# Patient Record
Sex: FEMALE | Race: WHITE | Employment: OTHER | URBAN - METROPOLITAN AREA
[De-identification: names, ages, dates, MRNs, and addresses within clinical notes are randomized per-mention and may not be internally consistent; named-entity substitution may affect disease eponyms.]

---

## 2018-03-12 ENCOUNTER — HOSPITAL ENCOUNTER (OUTPATIENT)
Age: 83
Discharge: HOME | End: 2018-03-14
Payer: MEDICARE

## 2018-03-12 PROCEDURE — 87088 URINE BACTERIA CULTURE: CPT

## 2018-03-12 PROCEDURE — 87186 SC STD MICRODIL/AGAR DIL: CPT

## 2018-03-12 PROCEDURE — 87077 CULTURE AEROBIC IDENTIFY: CPT

## 2018-03-14 LAB
ORGANISM: ABNORMAL
URINE CULTURE, ROUTINE: ABNORMAL
URINE CULTURE, ROUTINE: ABNORMAL

## 2019-01-12 ENCOUNTER — HOSPITAL ENCOUNTER (OUTPATIENT)
Age: 84
Discharge: HOME | End: 2019-01-14
Payer: MEDICARE

## 2019-01-12 LAB
ALBUMIN SERPL-MCNC: 3.8 G/DL (ref 3.5–5.2)
ALP BLD-CCNC: 58 U/L (ref 35–104)
ALT SERPL-CCNC: 9 U/L (ref 0–32)
ANION GAP SERPL CALCULATED.3IONS-SCNC: 16 MMOL/L (ref 7–16)
AST SERPL-CCNC: 15 U/L (ref 0–31)
BASOPHILS ABSOLUTE: 0.01 E9/L (ref 0–0.2)
BASOPHILS RELATIVE PERCENT: 0.1 % (ref 0–2)
BILIRUB SERPL-MCNC: 0.3 MG/DL (ref 0–1.2)
BUN BLDV-MCNC: 12 MG/DL (ref 8–23)
C-REACTIVE PROTEIN: <0.1 MG/DL (ref 0–0.4)
CALCIUM SERPL-MCNC: 9.1 MG/DL (ref 8.6–10.2)
CHLORIDE BLD-SCNC: 103 MMOL/L (ref 98–107)
CHOLESTEROL, TOTAL: 168 MG/DL (ref 0–199)
CO2: 24 MMOL/L (ref 22–29)
CREAT SERPL-MCNC: 0.6 MG/DL (ref 0.5–1)
EOSINOPHILS ABSOLUTE: 0.08 E9/L (ref 0.05–0.5)
EOSINOPHILS RELATIVE PERCENT: 1 % (ref 0–6)
FOLATE: >20 NG/ML (ref 4.8–24.2)
GFR AFRICAN AMERICAN: >60
GFR NON-AFRICAN AMERICAN: >60 ML/MIN/1.73
GLUCOSE BLD-MCNC: 131 MG/DL (ref 74–99)
HBA1C MFR BLD: 7.5 % (ref 4–5.6)
HCT VFR BLD CALC: 37.6 % (ref 34–48)
HDLC SERPL-MCNC: 56 MG/DL
HEMOGLOBIN: 12.4 G/DL (ref 11.5–15.5)
IMMATURE GRANULOCYTES #: 0.02 E9/L
IMMATURE GRANULOCYTES %: 0.2 % (ref 0–5)
LDL CHOLESTEROL CALCULATED: 87 MG/DL (ref 0–99)
LYMPHOCYTES ABSOLUTE: 2.46 E9/L (ref 1.5–4)
LYMPHOCYTES RELATIVE PERCENT: 30.2 % (ref 20–42)
MCH RBC QN AUTO: 31.5 PG (ref 26–35)
MCHC RBC AUTO-ENTMCNC: 33 % (ref 32–34.5)
MCV RBC AUTO: 95.4 FL (ref 80–99.9)
MONOCYTES ABSOLUTE: 0.64 E9/L (ref 0.1–0.95)
MONOCYTES RELATIVE PERCENT: 7.9 % (ref 2–12)
NEUTROPHILS ABSOLUTE: 4.94 E9/L (ref 1.8–7.3)
NEUTROPHILS RELATIVE PERCENT: 60.6 % (ref 43–80)
PDW BLD-RTO: 12.4 FL (ref 11.5–15)
PLATELET # BLD: 244 E9/L (ref 130–450)
PMV BLD AUTO: 10.4 FL (ref 7–12)
POTASSIUM SERPL-SCNC: 4 MMOL/L (ref 3.5–5)
RBC # BLD: 3.94 E12/L (ref 3.5–5.5)
SEDIMENTATION RATE, ERYTHROCYTE: 2 MM/HR (ref 0–20)
SODIUM BLD-SCNC: 143 MMOL/L (ref 132–146)
T4 FREE: 1.04 NG/DL (ref 0.93–1.7)
TOTAL PROTEIN: 6.1 G/DL (ref 6.4–8.3)
TRIGL SERPL-MCNC: 125 MG/DL (ref 0–149)
TSH SERPL DL<=0.05 MIU/L-ACNC: 1.65 UIU/ML (ref 0.27–4.2)
VITAMIN B-12: 513 PG/ML (ref 211–946)
VLDLC SERPL CALC-MCNC: 25 MG/DL
WBC # BLD: 8.2 E9/L (ref 4.5–11.5)

## 2019-01-12 PROCEDURE — 84439 ASSAY OF FREE THYROXINE: CPT

## 2019-01-12 PROCEDURE — 83036 HEMOGLOBIN GLYCOSYLATED A1C: CPT

## 2019-01-12 PROCEDURE — 82607 VITAMIN B-12: CPT

## 2019-01-12 PROCEDURE — 82746 ASSAY OF FOLIC ACID SERUM: CPT

## 2019-01-12 PROCEDURE — 80061 LIPID PANEL: CPT

## 2019-01-12 PROCEDURE — 85651 RBC SED RATE NONAUTOMATED: CPT

## 2019-01-12 PROCEDURE — 80053 COMPREHEN METABOLIC PANEL: CPT

## 2019-01-12 PROCEDURE — 84443 ASSAY THYROID STIM HORMONE: CPT

## 2019-01-12 PROCEDURE — 85025 COMPLETE CBC W/AUTO DIFF WBC: CPT

## 2019-01-12 PROCEDURE — 86140 C-REACTIVE PROTEIN: CPT

## 2019-01-18 ENCOUNTER — HOSPITAL ENCOUNTER (OUTPATIENT)
Age: 84
Discharge: HOME | End: 2019-01-20
Payer: MEDICARE

## 2019-01-18 PROCEDURE — 80177 DRUG SCRN QUAN LEVETIRACETAM: CPT

## 2019-01-20 LAB — KEPPRA: 2 UG/ML (ref 12–46)

## 2019-02-22 ENCOUNTER — HOSPITAL ENCOUNTER (OUTPATIENT)
Age: 84
Discharge: HOME | End: 2019-02-24
Payer: MEDICARE

## 2019-02-22 PROCEDURE — 80177 DRUG SCRN QUAN LEVETIRACETAM: CPT

## 2019-02-24 LAB — KEPPRA: 2 UG/ML (ref 12–46)

## 2019-08-01 ENCOUNTER — HOSPITAL ENCOUNTER (OUTPATIENT)
Age: 84
Discharge: HOME | End: 2019-08-03
Payer: MEDICARE

## 2019-08-01 LAB
BILIRUBIN URINE: NEGATIVE
BLOOD, URINE: NEGATIVE
CLARITY: ABNORMAL
COLOR: YELLOW
GLUCOSE URINE: NEGATIVE MG/DL
KETONES, URINE: NEGATIVE MG/DL
LEUKOCYTE ESTERASE, URINE: ABNORMAL
NITRITE, URINE: NEGATIVE
PH UA: 5.5 (ref 5–9)
PROTEIN UA: NEGATIVE MG/DL
SPECIFIC GRAVITY UA: >=1.03 (ref 1–1.03)
UROBILINOGEN, URINE: 0.2 E.U./DL

## 2019-08-01 PROCEDURE — 87088 URINE BACTERIA CULTURE: CPT

## 2019-08-01 PROCEDURE — 81001 URINALYSIS AUTO W/SCOPE: CPT

## 2019-08-01 PROCEDURE — 87186 SC STD MICRODIL/AGAR DIL: CPT

## 2019-08-02 LAB
AMORPHOUS: ABNORMAL
BACTERIA: ABNORMAL /HPF
CRYSTALS, UA: ABNORMAL
EPITHELIAL CELLS, UA: ABNORMAL /HPF
RBC UA: ABNORMAL /HPF (ref 0–2)
WBC UA: ABNORMAL /HPF (ref 0–5)

## 2019-08-04 LAB
ORGANISM: ABNORMAL
ORGANISM: ABNORMAL
URINE CULTURE, ROUTINE: ABNORMAL

## 2019-08-14 ENCOUNTER — HOSPITAL ENCOUNTER (INPATIENT)
Dept: HOSPITAL 83 - ED | Age: 84
LOS: 5 days | Discharge: HOME HEALTH | DRG: 690 | End: 2019-08-19
Attending: INTERNAL MEDICINE | Admitting: INTERNAL MEDICINE
Payer: MEDICARE

## 2019-08-14 VITALS — SYSTOLIC BLOOD PRESSURE: 121 MMHG | DIASTOLIC BLOOD PRESSURE: 78 MMHG

## 2019-08-14 VITALS — BODY MASS INDEX: 25.11 KG/M2 | WEIGHT: 160 LBS | HEIGHT: 66.97 IN

## 2019-08-14 VITALS — DIASTOLIC BLOOD PRESSURE: 89 MMHG

## 2019-08-14 DIAGNOSIS — Z90.79: ICD-10-CM

## 2019-08-14 DIAGNOSIS — D72.810: ICD-10-CM

## 2019-08-14 DIAGNOSIS — I25.2: ICD-10-CM

## 2019-08-14 DIAGNOSIS — Z91.040: ICD-10-CM

## 2019-08-14 DIAGNOSIS — Z88.8: ICD-10-CM

## 2019-08-14 DIAGNOSIS — Z90.722: ICD-10-CM

## 2019-08-14 DIAGNOSIS — E11.65: ICD-10-CM

## 2019-08-14 DIAGNOSIS — I10: ICD-10-CM

## 2019-08-14 DIAGNOSIS — Z79.82: ICD-10-CM

## 2019-08-14 DIAGNOSIS — Z79.84: ICD-10-CM

## 2019-08-14 DIAGNOSIS — M54.5: ICD-10-CM

## 2019-08-14 DIAGNOSIS — I25.810: ICD-10-CM

## 2019-08-14 DIAGNOSIS — Z51.5: ICD-10-CM

## 2019-08-14 DIAGNOSIS — Z90.710: ICD-10-CM

## 2019-08-14 DIAGNOSIS — N39.0: Primary | ICD-10-CM

## 2019-08-14 DIAGNOSIS — Z95.1: ICD-10-CM

## 2019-08-14 DIAGNOSIS — Z66: ICD-10-CM

## 2019-08-14 DIAGNOSIS — K59.00: ICD-10-CM

## 2019-08-14 DIAGNOSIS — G93.49: ICD-10-CM

## 2019-08-14 DIAGNOSIS — G40.209: ICD-10-CM

## 2019-08-14 DIAGNOSIS — G89.29: ICD-10-CM

## 2019-08-14 DIAGNOSIS — I45.2: ICD-10-CM

## 2019-08-14 DIAGNOSIS — Z79.899: ICD-10-CM

## 2019-08-14 DIAGNOSIS — B96.20: ICD-10-CM

## 2019-08-14 DIAGNOSIS — M48.00: ICD-10-CM

## 2019-08-14 DIAGNOSIS — Z84.89: ICD-10-CM

## 2019-08-14 DIAGNOSIS — F01.51: ICD-10-CM

## 2019-08-14 DIAGNOSIS — I35.0: ICD-10-CM

## 2019-08-14 DIAGNOSIS — Z95.0: ICD-10-CM

## 2019-08-14 LAB
ALBUMIN SERPL-MCNC: 3.3 GM/DL (ref 3.1–4.5)
ALP SERPL-CCNC: 76 U/L (ref 45–117)
ALT SERPL W P-5'-P-CCNC: 14 U/L (ref 12–78)
APPEARANCE UR: (no result)
AST SERPL-CCNC: 7 IU/L (ref 3–35)
BACTERIA #/AREA URNS HPF: (no result) /[HPF]
BASOPHILS # BLD AUTO: 0 10*3/UL (ref 0–0.1)
BASOPHILS NFR BLD AUTO: 0.1 % (ref 0–1)
BILIRUB UR QL STRIP: NEGATIVE
BUN SERPL-MCNC: 13 MG/DL (ref 7–24)
CHLORIDE SERPL-SCNC: 103 MMOL/L (ref 98–107)
COLOR UR: YELLOW
CREAT SERPL-MCNC: 0.69 MG/DL (ref 0.55–1.02)
EOSINOPHIL # BLD AUTO: 0.1 10*3/UL (ref 0–0.4)
EOSINOPHIL # BLD AUTO: 1 % (ref 1–4)
EPI CELLS #/AREA URNS HPF: (no result) /[HPF]
ERYTHROCYTE [DISTWIDTH] IN BLOOD BY AUTOMATED COUNT: 12.9 % (ref 0–14.5)
GLUCOSE UR QL: (no result)
HCT VFR BLD AUTO: 39.9 % (ref 37–47)
HGB BLD-MCNC: 13.3 G/DL (ref 12–16)
HGB UR QL STRIP: (no result)
KETONES UR QL STRIP: NEGATIVE
LEUKOCYTE ESTERASE UR QL STRIP: (no result)
LYMPHOCYTES # BLD AUTO: 2.2 10*3/UL (ref 1.3–4.4)
LYMPHOCYTES NFR BLD AUTO: 25.7 % (ref 27–41)
MCH RBC QN AUTO: 31.4 PG (ref 27–31)
MCHC RBC AUTO-ENTMCNC: 33.3 G/DL (ref 33–37)
MCV RBC AUTO: 94.1 FL (ref 81–99)
MONOCYTES # BLD AUTO: 0.8 10*3/UL (ref 0.1–1)
MONOCYTES NFR BLD MANUAL: 8.6 % (ref 3–9)
NEUT #: 5.6 10*3/UL (ref 2.3–7.9)
NEUT %: 64.3 % (ref 47–73)
NITRITE UR QL STRIP: POSITIVE
NRBC BLD QL AUTO: 0 % (ref 0–0)
PH UR STRIP: 6 [PH] (ref 5–9)
PLATELET # BLD AUTO: 242 10*3/UL (ref 130–400)
PMV BLD AUTO: 8.9 FL (ref 9.6–12.3)
POTASSIUM SERPL-SCNC: 4.2 MMOL/L (ref 3.5–5.1)
PROT SERPL-MCNC: 6.9 GM/DL (ref 6.4–8.2)
RBC # BLD AUTO: 4.24 10*6/UL (ref 4.1–5.1)
RBC #/AREA URNS HPF: (no result) RBC/HPF (ref 0–2)
SODIUM SERPL-SCNC: 136 MMOL/L (ref 136–145)
SP GR UR: 1.01 (ref 1–1.03)
UROBILINOGEN UR STRIP-MCNC: 0.2 E.U./DL (ref 0.2–1)
WBC #/AREA URNS HPF: (no result) WBC/HPF (ref 0–5)
WBC NRBC COR # BLD AUTO: 8.7 10*3/UL (ref 4.8–10.8)

## 2019-08-14 NOTE — EKG
[place], [place]
 
                               ELECTROCARDIOGRAM REPORT
 
        NAME: DEYVI LEMA                 ACCT #: [id number]  
        UNIT #: [id number]                        ROOM: 410       
        DOCTOR: BARBIE DRAFT REPORT          BIRTHDATE: [birth year]
 
 
 

 
 
                           [facility]
                                       
Test Date:    2019-08-15               Test Time:    06:25:18
Pat Name:     DEYVI LEMA           Department:   
Patient ID:   [id number]             Room:         410 1
Gender:       F                        Technician:   
:          1933               Requested By: TAI MALDONADO
Order Number: [id number]      Reading MD:   Kwasi Bowers MD
                                 Measurements
Intervals                              Axis          
Rate:         76                       P:            19
CT:           181                      QRS:          -40
QRSD:         142                      T:            112
QT:           444                                    
QTc:          500                                    
                           Interpretive Statements
Sinus rhythm
Nonspecific intraventricular conduction delay, consider atypical RBBB
LAFB
Nonspecific ST \T\ T abnormalities
 
 
 
Electronically Signed On 8- 5:42:20 PDT by Kwasi Bowers MD
 
CM:EKGRPT:ELECTROCARDIOGRAM REPORT
 
D: 08/15/19 0625
T: 08/15/19 0542
    
TAI MALDONADO                                            
EPIPHANY DRAFT REPORT         
TAI MALDONADO

## 2019-08-14 NOTE — EKG
[place], [place]
 
                               ELECTROCARDIOGRAM REPORT
 
        NAME: DEYVI LEMA                 ACCT #: [id number]  
        UNIT #: [id number]                        ROOM: 410       
        DOCTOR: BARBIE DRAFT REPORT          BIRTHDATE: [birth year]
 
 
 

 
 
                           [facility]
                                       
Test Date:    2019               Test Time:    15:57:15
Pat Name:     DEYVI LEMA           Department:   
Patient ID:   [id number]             Room:         410
Gender:       F                        Technician:   
:          1933               Requested By: ANITHA YU
Order Number: [id number]      Reading MD:   Kwasi Bowers MD
                                 Measurements
Intervals                              Axis          
Rate:         71                       P:            27
NJ:           184                      QRS:          -35
QRSD:         136                      T:            56
QT:           459                                    
QTc:          499                                    
                           Interpretive Statements
Sinus rhythm
Nonspecific intraventricular conduction delay
LAFB
 
 
 
Electronically Signed On 8- 5:38:08 PDT by Kwasi Bowers MD
 
CM:EKGRPT:ELECTROCARDIOGRAM REPORT
 
D: 19 1557
T: 08/15/19 0538
    
ANITHA VALENTIN DRAFT REPORT         
ANITHA YU M.D.

## 2019-08-14 NOTE — NUR
A 85, admitted to , under the
services of PILO Blandon DO with a diagnosis of UTI.
Chief complaint is CHANGE IN MENTAL STATUS.
Patient arrived via stretcher from ER.
Monitor applied. Initial assessment completed.
Vital signs taken and recorded.
PILO BLANDON DO notified of admission to the unit.
Orders received.
See assessment for past medical history, medications
and allergies.
Patient and/or family oriented to unit. [facility]
visitation policy reviewed.
Clothing/patient valuable form completed.
 
HOMER PEREA
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
## 2019-08-15 VITALS — DIASTOLIC BLOOD PRESSURE: 59 MMHG | SYSTOLIC BLOOD PRESSURE: 149 MMHG

## 2019-08-15 VITALS — DIASTOLIC BLOOD PRESSURE: 41 MMHG

## 2019-08-15 VITALS — SYSTOLIC BLOOD PRESSURE: 148 MMHG | DIASTOLIC BLOOD PRESSURE: 74 MMHG

## 2019-08-15 VITALS — DIASTOLIC BLOOD PRESSURE: 92 MMHG

## 2019-08-15 VITALS — DIASTOLIC BLOOD PRESSURE: 70 MMHG

## 2019-08-15 LAB
APTT PPP: 25.1 SECONDS (ref 20–32.1)
BASOPHILS # BLD AUTO: 0 10*3/UL (ref 0–0.1)
BASOPHILS NFR BLD AUTO: 0 % (ref 0–1)
BUN SERPL-MCNC: 10 MG/DL (ref 7–24)
CHLORIDE SERPL-SCNC: 104 MMOL/L (ref 98–107)
CREAT SERPL-MCNC: 0.59 MG/DL (ref 0.55–1.02)
EOSINOPHIL # BLD AUTO: 0.1 10*3/UL (ref 0–0.4)
EOSINOPHIL # BLD AUTO: 0.9 % (ref 1–4)
ERYTHROCYTE [DISTWIDTH] IN BLOOD BY AUTOMATED COUNT: 12.7 % (ref 0–14.5)
HCT VFR BLD AUTO: 39.8 % (ref 37–47)
HGB BLD-MCNC: 13.1 G/DL (ref 12–16)
INR BLD: 1 (ref 2–3.5)
LYMPHOCYTES # BLD AUTO: 1.6 10*3/UL (ref 1.3–4.4)
LYMPHOCYTES NFR BLD AUTO: 22.3 % (ref 27–41)
MCH RBC QN AUTO: 31 PG (ref 27–31)
MCHC RBC AUTO-ENTMCNC: 32.9 G/DL (ref 33–37)
MCV RBC AUTO: 94.3 FL (ref 81–99)
MONOCYTES # BLD AUTO: 0.6 10*3/UL (ref 0.1–1)
MONOCYTES NFR BLD MANUAL: 8.2 % (ref 3–9)
NEUT #: 4.7 10*3/UL (ref 2.3–7.9)
NEUT %: 68.3 % (ref 47–73)
NRBC BLD QL AUTO: 0 10*3/UL (ref 0–0)
PHOSPHATE SERPL-MCNC: 2.9 MG/DL (ref 2.5–4.9)
PLATELET # BLD AUTO: 233 10*3/UL (ref 130–400)
PMV BLD AUTO: 9.6 FL (ref 9.6–12.3)
POTASSIUM SERPL-SCNC: 3.6 MMOL/L (ref 3.5–5.1)
RBC # BLD AUTO: 4.22 10*6/UL (ref 4.1–5.1)
SODIUM SERPL-SCNC: 139 MMOL/L (ref 136–145)
TSH SERPL DL<=0.005 MIU/L-ACNC: 3.06 UIU/ML (ref 0.36–4.75)
WBC NRBC COR # BLD AUTO: 6.9 10*3/UL (ref 4.8–10.8)

## 2019-08-15 NOTE — NUR
DEYVI LEMA [id number]
 
Please refer to the physician's history and physical for past medical history,
comorbid conditions, and allergies.
   Diagnosis: UTI ABNORMAL EKG
 
   Xavi Score: 11,HIGH RISK
 
WOUND DESCRIPTIONS:
Wound Number: 1
Location of the wound: right posterior calf
Thickness: Full
Size: 1.1cm x 1.8cm x 0.1cm
Tunneling: none
Undermining: none
Sinus Tract: none
Presence of Exudate: Serosanguineous
Amount: Light
Color: Brown, yellow, red
Odor: None
Periwound Skin Appearance: Erythema
Wound edges: approximated
Pain (associated with wound): none at time of assessment
How does patient state this happened? pt unable to state how this happened
   Surface the patient is resting on: Isoflex
 
SKIN PREVENTION RECOMMENDATION:
   1.  Pressure redistribution support surface as appropriate
   2.  Elevate heels
   3.  Remove boots/TEDS every shift and reapply
   4.  Head of bed 30 degrees as tolerated
   5.  Assess nutrition and hydration
   6.  Manage moisture
   7.  Avoid the use of containment devices while in bed
   8.  Use absorptive products on surfaces limit layers of linens on bed
   9.  Turn and reposition every 1-2 hours in bed and every 1 hour in chair as
       tolerated
   10. Weight shifts every 15 minutes while up in chair
   11. Offloading with pillows or device to keep heels elevated off bed
   12. Monitor skin at least every shift
   13. Inspect under medical devices twice a day
 
WOUND TREATMENT RECOMMENDATIONS:
Venous and arterial studies of BLE's due to wound
Consult podiatry for possible debridement if studies allow.
Full thickness guidelines: Cleanse right posterior calf with nss and apply
sureprep around the wound therahoney to wound bed and cover with optifoam
gentle.
Heel raiser pro boots to bilateral heels while in bed.
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
## 2019-08-15 NOTE — NUR
PATIENT IS SLEEPING WITH EASY AND REGULAR RESPERS ON ROOM AIR. AWAKENED EASILY
FOR ASSESSMENT, ASSESSMENT IS COMPLETE WITH NO C/O OR S/S OF DISTRESS NOTED AT
THIS TIME. BED IS LOW, LOCKED, ALARMED, AND CALL LIGHT IS WITHIN REACH. WILL
CONTINUE TO MONITOR SEE SHIFT ASSESSMENT.

## 2019-08-15 NOTE — NUR
Upon discharge recommend patient to follow up for wound care in outpatient
setting continue current wound care orders at discharging facility.

## 2019-08-15 NOTE — NUR
case management visits with patient, patient is somewhat confused, per chart,
she is a resident of [facility] and was admitted for medical
clearance for U, case management/discharge planner will follow for any needs

## 2019-08-15 NOTE — NUR
PHYSICAL THERAPY
 
Physical therapy evaluation attempted. Patient too drowsy to participate at
this time. Nursing requests PT return at a later date. Thank you. Samantha Ballard,PT,DPT.

## 2019-08-16 VITALS — SYSTOLIC BLOOD PRESSURE: 131 MMHG | DIASTOLIC BLOOD PRESSURE: 74 MMHG

## 2019-08-16 VITALS — SYSTOLIC BLOOD PRESSURE: 157 MMHG | DIASTOLIC BLOOD PRESSURE: 65 MMHG

## 2019-08-16 VITALS — SYSTOLIC BLOOD PRESSURE: 130 MMHG | DIASTOLIC BLOOD PRESSURE: 72 MMHG

## 2019-08-16 VITALS — DIASTOLIC BLOOD PRESSURE: 64 MMHG | SYSTOLIC BLOOD PRESSURE: 137 MMHG

## 2019-08-16 LAB
25(OH)D3 SERPL-MCNC: 26.5 NG/ML (ref 30–100)
BASOPHILS # BLD AUTO: 0 10*3/UL (ref 0–0.1)
BASOPHILS NFR BLD AUTO: 0 % (ref 0–1)
BUN SERPL-MCNC: 14 MG/DL (ref 7–24)
CHLORIDE SERPL-SCNC: 108 MMOL/L (ref 98–107)
CREAT SERPL-MCNC: 0.73 MG/DL (ref 0.55–1.02)
EOSINOPHIL # BLD AUTO: 0.2 10*3/UL (ref 0–0.4)
EOSINOPHIL # BLD AUTO: 1.9 % (ref 1–4)
ERYTHROCYTE [DISTWIDTH] IN BLOOD BY AUTOMATED COUNT: 13.2 % (ref 0–14.5)
HCT VFR BLD AUTO: 40 % (ref 37–47)
HGB BLD-MCNC: 13 G/DL (ref 12–16)
LYMPHOCYTES # BLD AUTO: 2.4 10*3/UL (ref 1.3–4.4)
LYMPHOCYTES NFR BLD AUTO: 29.7 % (ref 27–41)
MCH RBC QN AUTO: 31.2 PG (ref 27–31)
MCHC RBC AUTO-ENTMCNC: 32.5 G/DL (ref 33–37)
MCV RBC AUTO: 95.9 FL (ref 81–99)
MONOCYTES # BLD AUTO: 0.6 10*3/UL (ref 0.1–1)
MONOCYTES NFR BLD MANUAL: 7.8 % (ref 3–9)
NEUT #: 4.8 10*3/UL (ref 2.3–7.9)
NEUT %: 60.3 % (ref 47–73)
NRBC BLD QL AUTO: 0 10*3/UL (ref 0–0)
PLATELET # BLD AUTO: 240 10*3/UL (ref 130–400)
PMV BLD AUTO: 9.7 FL (ref 9.6–12.3)
POTASSIUM SERPL-SCNC: 4.2 MMOL/L (ref 3.5–5.1)
RBC # BLD AUTO: 4.17 10*6/UL (ref 4.1–5.1)
SODIUM SERPL-SCNC: 144 MMOL/L (ref 136–145)
VITAMIN B12: 345 PG/ML (ref 247–911)
WBC NRBC COR # BLD AUTO: 7.9 10*3/UL (ref 4.8–10.8)

## 2019-08-16 NOTE — NUR
Faxed updated clinicals to [facility] for review. Notified facility patient may
not go to U if behaviors clear after UTI treatment. Patient is long term
care and ok to return when medically stable for discharge.

## 2019-08-16 NOTE — NUR
PT BLOOD SUGAR OBTAINED PER EMAR ORDER. BLOOD SUGAR . PT REQUIRES 3
UNITS COVERAGE BUT IS REFUSING INSULIN INJECTION AT THIS TIME. WILL CONTINUE
TO MONITOR AND NOTIFY PHYSICIAN. CALL LIGHT IN REACH. PT RESTING IN BED

## 2019-08-16 NOTE — NUR
PT AGREES TO TAKE HS MEDICATIONS BUT UPON TRYING TO TAKE MEDICATIONS, PT SPITS
MEDICATIONS OUT ONTO FLOOR.  PT STATES THAT SHE DOES NOT WANT TO TRY TO TAKE
MEDICATIONS AGAIN AT THIS TIME.  WILL ATTEMPT AT A DIFFERENT TIME. PT IS
COOPERATIVE WITH STAFF AT THIS TIME AND HAS NO OTHER S/S OF DISTRESS. HOB
ELEVATED. BREAKS LOCKED ON BED, CALL LIGHT IN REACH.

## 2019-08-16 NOTE — NUR
Nutritional Support Services Note: Pt has a wound of back of right calf.
Currently receiving an 1800cal diabetic diet with Glucerna po TID with meals.
She is eating 100% of all meals. No other nutrition intervention needed at
this time. Will follow.                     Amber Saenz Rdn Ld

## 2019-08-16 NOTE — NUR
Patient was seen for an occupational therapy evaluation this date, 8/16/19.
Patient is a 84 y/o female admitted for UTI and change in mental status.
Patient precautions include fall risk and decreased cognitive status. Patient
to work on increasing ADLs, safety, cognition, and transfers. Patient would
benefit from skilled OT treatment. Patient is a high eval- [id number]. OTR rec
return to LTC with OT/PT services.
 
Heidi Gillespie, OTR/L

## 2019-08-16 NOTE — NUR
PHYSICIAN COMES TO FLOOR AND TALKS WITH PATIENT. PT STATES THAT SHE WOULD LIKE
TO CALL HER SON GALINDO AND TALK TO HIM REGARDING HER HS MEDICATIONS. PT SON GALINDO
IS CALLED BY NURSE AND TRANSFERRED TO PT PHONE IN ROOM. PT TALKS WITH SON AND
AGITATION DECREASES AT THIS TIME.  PT HS MEDICATIONS ARE EXPLAINED TO PT.  PT
STATES THAT SHE WILL TAKE HER MEDICATION NOW. WILL ATTEMPT TO MEDICATE PT. PT
SITTING UP IN BED WITH NO S/S OF DISTRESS. RESPIRATIONS EASY AND UNLABORED.
CALL LIGHT IN REACH.

## 2019-08-16 NOTE — NUR
PHYSICAL THERAPY
 
Physical therapy evaluation complete, 4E. Full evaluation/details to follow.
Moderate evaluation (43835) per chart review and evaluation.  PT to progress
with bed mobility, sitting balance, and transfers per POC. Recommend return to
nursing home at discharge. Thank you. Samantha Ballard,PT,DPT.
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
## 2019-08-17 VITALS — DIASTOLIC BLOOD PRESSURE: 76 MMHG

## 2019-08-17 VITALS — SYSTOLIC BLOOD PRESSURE: 122 MMHG | DIASTOLIC BLOOD PRESSURE: 62 MMHG

## 2019-08-17 VITALS — SYSTOLIC BLOOD PRESSURE: 141 MMHG | DIASTOLIC BLOOD PRESSURE: 70 MMHG

## 2019-08-17 VITALS — DIASTOLIC BLOOD PRESSURE: 84 MMHG

## 2019-08-17 VITALS — DIASTOLIC BLOOD PRESSURE: 57 MMHG | SYSTOLIC BLOOD PRESSURE: 141 MMHG

## 2019-08-17 VITALS — DIASTOLIC BLOOD PRESSURE: 72 MMHG

## 2019-08-17 NOTE — NUR
PT RESTING IN BED, EASILY AROUSED. ASSESSMENT COMPLETE AT THIS TIME. HS
MEDICATIONS REFUSED BY PT AFTER SEVERAL ATTEMPTS. BLOOD SUGAR OBTAINED-256
AND COVERAGE GIVEN AS ORDERED, SEE EMAR.  PT RESPIRATIONS EASY AND UNLABORED
ON ROOM AIR. NO S/S OF DISTRESS. BP WNL. WILL CONITNUE TO MONITOR PT. ALL
SAFETY MEASURES IN PLACE. CALL LIGHT IN REACH.

## 2019-08-17 NOTE — NUR
SITTING UP IN CHAIR. DROWSY. ALERT TO PERSON ONLY. FINE RALES HEARD IN RIGHT
POSTERIOR BASE. REMAINS ON ROOM AIR. MURMUR HEARD.

## 2019-08-18 VITALS — SYSTOLIC BLOOD PRESSURE: 146 MMHG | DIASTOLIC BLOOD PRESSURE: 90 MMHG

## 2019-08-18 VITALS — DIASTOLIC BLOOD PRESSURE: 88 MMHG | SYSTOLIC BLOOD PRESSURE: 155 MMHG

## 2019-08-18 VITALS — DIASTOLIC BLOOD PRESSURE: 64 MMHG | SYSTOLIC BLOOD PRESSURE: 161 MMHG

## 2019-08-18 VITALS — SYSTOLIC BLOOD PRESSURE: 144 MMHG | DIASTOLIC BLOOD PRESSURE: 83 MMHG

## 2019-08-18 VITALS — DIASTOLIC BLOOD PRESSURE: 58 MMHG

## 2019-08-18 NOTE — NUR
DR. SPRING NOTIFIED THAT DR. JEAN-BAPTISTE SAID THAT PATIENT CAN GO TO [facility] ONCE
MEDICALLY STABLE.
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
## 2019-08-19 ENCOUNTER — HOSPITAL ENCOUNTER (INPATIENT)
Dept: HOSPITAL 83 - 3N | Age: 84
LOS: 18 days | Discharge: OTHER INSTITUTION | DRG: 883 | End: 2019-09-06
Attending: PSYCHIATRY & NEUROLOGY | Admitting: PSYCHIATRY & NEUROLOGY
Payer: MEDICARE

## 2019-08-19 VITALS — DIASTOLIC BLOOD PRESSURE: 63 MMHG

## 2019-08-19 VITALS — SYSTOLIC BLOOD PRESSURE: 143 MMHG | DIASTOLIC BLOOD PRESSURE: 63 MMHG

## 2019-08-19 VITALS — DIASTOLIC BLOOD PRESSURE: 72 MMHG | SYSTOLIC BLOOD PRESSURE: 138 MMHG

## 2019-08-19 VITALS — SYSTOLIC BLOOD PRESSURE: 140 MMHG | DIASTOLIC BLOOD PRESSURE: 72 MMHG

## 2019-08-19 VITALS — DIASTOLIC BLOOD PRESSURE: 60 MMHG

## 2019-08-19 VITALS — BODY MASS INDEX: 23.54 KG/M2 | WEIGHT: 150 LBS | HEIGHT: 66.97 IN

## 2019-08-19 VITALS — SYSTOLIC BLOOD PRESSURE: 128 MMHG | DIASTOLIC BLOOD PRESSURE: 86 MMHG

## 2019-08-19 DIAGNOSIS — I25.2: ICD-10-CM

## 2019-08-19 DIAGNOSIS — F63.81: Primary | ICD-10-CM

## 2019-08-19 DIAGNOSIS — Z79.84: ICD-10-CM

## 2019-08-19 DIAGNOSIS — F23: ICD-10-CM

## 2019-08-19 DIAGNOSIS — G40.209: ICD-10-CM

## 2019-08-19 DIAGNOSIS — I10: ICD-10-CM

## 2019-08-19 DIAGNOSIS — Z79.82: ICD-10-CM

## 2019-08-19 DIAGNOSIS — N30.01: ICD-10-CM

## 2019-08-19 DIAGNOSIS — K59.01: ICD-10-CM

## 2019-08-19 DIAGNOSIS — M54.5: ICD-10-CM

## 2019-08-19 DIAGNOSIS — Z95.1: ICD-10-CM

## 2019-08-19 DIAGNOSIS — Z95.0: ICD-10-CM

## 2019-08-19 DIAGNOSIS — M48.07: ICD-10-CM

## 2019-08-19 DIAGNOSIS — Z90.710: ICD-10-CM

## 2019-08-19 DIAGNOSIS — Z84.89: ICD-10-CM

## 2019-08-19 DIAGNOSIS — Z88.8: ICD-10-CM

## 2019-08-19 DIAGNOSIS — Z79.899: ICD-10-CM

## 2019-08-19 DIAGNOSIS — Z91.040: ICD-10-CM

## 2019-08-19 DIAGNOSIS — I25.10: ICD-10-CM

## 2019-08-19 DIAGNOSIS — Z90.79: ICD-10-CM

## 2019-08-19 DIAGNOSIS — I45.10: ICD-10-CM

## 2019-08-19 DIAGNOSIS — G89.29: ICD-10-CM

## 2019-08-19 DIAGNOSIS — E11.65: ICD-10-CM

## 2019-08-19 DIAGNOSIS — F01.51: ICD-10-CM

## 2019-08-19 DIAGNOSIS — Z90.722: ICD-10-CM

## 2019-08-19 LAB
BASOPHILS # BLD AUTO: 0 10*3/UL (ref 0–0.1)
BASOPHILS NFR BLD AUTO: 0.1 % (ref 0–1)
CREAT SERPL-MCNC: 0.66 MG/DL (ref 0.55–1.02)
EOSINOPHIL # BLD AUTO: 0.1 10*3/UL (ref 0–0.4)
EOSINOPHIL # BLD AUTO: 1.5 % (ref 1–4)
ERYTHROCYTE [DISTWIDTH] IN BLOOD BY AUTOMATED COUNT: 12.9 % (ref 0–14.5)
HCT VFR BLD AUTO: 42 % (ref 37–47)
HGB BLD-MCNC: 13.6 G/DL (ref 12–16)
LYMPHOCYTES # BLD AUTO: 2.5 10*3/UL (ref 1.3–4.4)
LYMPHOCYTES NFR BLD AUTO: 30.6 % (ref 27–41)
MCH RBC QN AUTO: 31.1 PG (ref 27–31)
MCHC RBC AUTO-ENTMCNC: 32.4 G/DL (ref 33–37)
MCV RBC AUTO: 96.1 FL (ref 81–99)
MONOCYTES # BLD AUTO: 0.6 10*3/UL (ref 0.1–1)
MONOCYTES NFR BLD MANUAL: 6.9 % (ref 3–9)
NEUT #: 4.8 10*3/UL (ref 2.3–7.9)
NEUT %: 60.5 % (ref 47–73)
NRBC BLD QL AUTO: 0 10*3/UL (ref 0–0)
PLATELET # BLD AUTO: 248 10*3/UL (ref 130–400)
PMV BLD AUTO: 9.5 FL (ref 9.6–12.3)
RBC # BLD AUTO: 4.37 10*6/UL (ref 4.1–5.1)
WBC NRBC COR # BLD AUTO: 8 10*3/UL (ref 4.8–10.8)

## 2019-08-19 NOTE — NUR
DEYVI LEMA a 85 year old F admitted via
wheel chair from the ADMITTING as a voluntary admission.
Arrived on unit at 1445.
ALLERGIES: DEPAKOTE, LATEX, NAMENDA, ABCIXIMAB.
Vital signs are: 97.2-65-14 143/63.
The client signed consent for admission. Pt too tired to sign other paperwork.
Will attempt at another time. Pt does not want to complete skin assessment at
this time. Pt was medicated with vistaril prior to admission.
 [facility]. A search was conducted and hazardous articles were removed.
Client was oriented to the unit.
TORREY REYES

## 2019-08-19 NOTE — NUR
Pt was seen for approx 15 minutes in OT beginning a with grooming task
with set up while sitting at EOB with supervision. Fair minus sit balance
noted.  After grooming, sit to stand & transfer to williams-chair required Min A x
2.  present during therapy session.  Continue with OT POC.
Karlene PERALTA

## 2019-08-19 NOTE — NUR
PHYSICAL THERAPY
 
Patient seen this am 1;1 for therapy visit and was resting supine in bed, with
her  present upon therapist arrival. Patient voices no c/o's pain and
was very pleasant this morning. Patient transfers supine to sit EOB with MOD
A, tolerating static EOB sit, CGA, x 5 minutes. Patient demonstrated increased
fatigue which contributed to several bouts of retrograde posture prior to
completing sit to stand transfer, HHA/Min. Patient able to ambulate 5-6 steps,
then performed SPT to bedside Emilee chair and remained in chair with lap tray,
call light, and body alarm for safety. Will continue per POC as tolerated,
total treatment time 13 minutes. Shelton Maldonado, PTA

## 2019-08-19 NOTE — NUR
P--CONFUSION/ DIFFICULTY PROCESSING REQUESTS
I--ORIENTED TO PLACE AND TIME. REVIEWED MEDICATIONS AND WHAT THEY WHERE FOR.
CLIENT INTERESTED AT FIRST THEN DEVELOOPED A GLAZED LOOK OF NON COMPREHENSION.
MEDICATED PER ORDERS WITH MUCH ENCOURAGEMENT. DAYLIGHT ORDERED A SPEECH
CONSULT DUE TO POOR SWOLLOWING ABILITY
R--HI I AM DEYVI. IT IS 1951 AND COLLETTECARLOS IS PRESIDENT. OH ITS 2019. TOOK TWO
SPOONFULD OF APPLSAUCE WITH MEDICATION BUT THE LAST 3 WERE VERY DIFFICULT FOR
HER TO SWOLLOW. HOLDING IN HER MOUTH EVEN WITH INSTRUCTIONS TO SWOLLOW AND
APPEARS SHE IS UNABLE TO COMPREHEND COMMANDS. SWOLLOWED AFTER A FEW MINUTES
P-PASS ON TO MONITOR FOOD INTAKE. CONTINUE ORIENTATION, EMOTIONAL SUPPORT AND
MONITOR FOR SAFETY

## 2019-08-19 NOTE — NUR
PHYSICAL THERAPY
 
Nursing screen received and chart reviewed. Recommend continued skilled PT
services when medically appropriate. Thank you. Samantha Ballard,PT,DPT.

## 2019-08-20 VITALS — DIASTOLIC BLOOD PRESSURE: 60 MMHG | SYSTOLIC BLOOD PRESSURE: 149 MMHG

## 2019-08-20 VITALS — DIASTOLIC BLOOD PRESSURE: 66 MMHG | SYSTOLIC BLOOD PRESSURE: 142 MMHG

## 2019-08-20 LAB
25(OH)D3 SERPL-MCNC: 21.6 NG/ML (ref 30–100)
ALBUMIN SERPL-MCNC: 3.4 GM/DL (ref 3.1–4.5)
ALP SERPL-CCNC: 73 U/L (ref 45–117)
ALT SERPL W P-5'-P-CCNC: 17 U/L (ref 12–78)
AST SERPL-CCNC: 11 IU/L (ref 3–35)
BASOPHILS # BLD AUTO: 0 10*3/UL (ref 0–0.1)
BASOPHILS NFR BLD AUTO: 0 % (ref 0–1)
BUN SERPL-MCNC: 13 MG/DL (ref 7–24)
CHLORIDE SERPL-SCNC: 105 MMOL/L (ref 98–107)
CHOLEST SERPL-MCNC: 184 MG/DL (ref ?–200)
CREAT SERPL-MCNC: 0.76 MG/DL (ref 0.55–1.02)
EOSINOPHIL # BLD AUTO: 0.2 10*3/UL (ref 0–0.4)
EOSINOPHIL # BLD AUTO: 2.3 % (ref 1–4)
ERYTHROCYTE [DISTWIDTH] IN BLOOD BY AUTOMATED COUNT: 13 % (ref 0–14.5)
HCT VFR BLD AUTO: 44.7 % (ref 37–47)
HDLC SERPL-MCNC: 62 MG/DL (ref 40–60)
HGB BLD-MCNC: 14.6 G/DL (ref 12–16)
LDLC SERPL DIRECT ASSAY-MCNC: 107 MG/DL (ref 9–159)
LYMPHOCYTES # BLD AUTO: 2.2 10*3/UL (ref 1.3–4.4)
LYMPHOCYTES NFR BLD AUTO: 31.3 % (ref 27–41)
MCH RBC QN AUTO: 31.3 PG (ref 27–31)
MCHC RBC AUTO-ENTMCNC: 32.7 G/DL (ref 33–37)
MCV RBC AUTO: 95.9 FL (ref 81–99)
MONOCYTES # BLD AUTO: 0.6 10*3/UL (ref 0.1–1)
MONOCYTES NFR BLD MANUAL: 8.5 % (ref 3–9)
NEUT #: 4 10*3/UL (ref 2.3–7.9)
NEUT %: 57.6 % (ref 47–73)
NRBC BLD QL AUTO: 0 10*3/UL (ref 0–0)
PLATELET # BLD AUTO: 258 10*3/UL (ref 130–400)
PMV BLD AUTO: 9.2 FL (ref 9.6–12.3)
POTASSIUM SERPL-SCNC: 4.1 MMOL/L (ref 3.5–5.1)
PROT SERPL-MCNC: 7.3 GM/DL (ref 6.4–8.2)
RBC # BLD AUTO: 4.66 10*6/UL (ref 4.1–5.1)
SODIUM SERPL-SCNC: 140 MMOL/L (ref 136–145)
TRIGL SERPL-MCNC: 74 MG/DL (ref ?–150)
TSH SERPL DL<=0.005 MIU/L-ACNC: 3.36 UIU/ML (ref 0.36–4.75)
VITAMIN B12: 353 PG/ML (ref 247–911)
VLDLC SERPL CALC-MCNC: 15 MG/DL (ref 6–40)
WBC NRBC COR # BLD AUTO: 6.9 10*3/UL (ref 4.8–10.8)

## 2019-08-20 NOTE — NUR
SPEECH PATHOLOGY
Clinical swallowing evaluation completed as per orders due to difficulty
swallowing. Patient was transferred to [facility] from medical floor with
intermittent explosive disorder. Medical history includes UTI, CAD, DM, HTN,
MI, vascular dementia and complex partial seizure disorder. Patient receives a
regular diet and thin liquids and has been observed holding foods and
spitting out foods and medications. She was seen for assessment during
breakfast meal. She was sitting upright in a chair in activity room with
peers. Patient was alert and cooperative, answering simple questions
appropriately. She fed herself and ate a variety of solid foods and liquids.
She consumed 100% of breakfast and was asking for more food. She displayed no
overt difficulty during the meal. Recommend she remain on present diet. Short
term follow up will be conducted, likely one visit, to ensure safe tolerance
of diet as status may be different throughout the day. Results and satish. were
shared with patient's nurse who verbalized understanding. Refer to report in
[facility] for further information. Thank you for this referral.
 
REKHA MARC MSCCC-SLP

## 2019-08-20 NOTE — NUR
OCCUPATIONAL THERAPY CO-SIGN
 
I approve of the Occupational Therapy notes written above.
DEMARCUS DUNN OTR/GEORGE

## 2019-08-20 NOTE — NUR
Occupational Therapy evaluation completed on 3 with full eval to follow.
PRecautions include fall risk, reclining williams chair use, impaired cognition
with difficulty following complex commands and MMT instructions,assistance w/
all ADLs, +2 mod/max sit to stand assist, moderate complexity level 83447 via
chart review, testing and evaluation. Recommend OT per pOC and return to LTC
upon d/c. Thank you.
Geraldine Echeverria OTR/L

## 2019-08-20 NOTE — NUR
P: PT REFUSED PART OF AM PO MEDS, TOOK TWO BITES AND REFUSED THE REST. PT
REFUSED LUNCH. PT IRRITABLE WITH STAFF AT TIMES.
 
I: PROVIDE EMOTIONAL SUPPORT AND 1:1 FOR PT TO VOICE FEELINGS, ENCOURAGE MED
COMPLIANCE, ENCOURAGE PO INTAKE
 
R: PT CONTINUED TO REFUSE LUNCH AND PO AM MEDS. PT ALERT TO PERSON ONLY,
THINKS SHE IS AT THE [facility] AND THE YEAR IS 1951. PT CALM, MOOD IS
STABLE. NO HALLUCINATIONS NOTED. PT DENIES ANY SUICIDAL THOUGHTS. [facility] STAFF ASS[facility] FOR AMBULATION. PT CONTINENT OF BOWEL AND
BLADDER, EPISODES OF INCONTINENCE NOTED, CARE PROVIDED AS NEEDED.
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
## 2019-08-20 NOTE — NUR
DEYVI LEMA TANISHA [id number]
 
Please refer to the physician's history and physical for past medical history,
comorbid conditions, and allergies.
   Diagnosis: INTERMITTENT EXPLOSIVE DISORDER
 
   Xavi Score: 15,AT RISK
 
WOUND DESCRIPTIONS:
Wound Number: 1
Location of the wound: right posterior calf
Thickness: Full
Size: 1.1cm x 1.8cm x 0.1cm
Tunneling: none
Undermining: none
Sinus Tract: none
Presence of Exudate: none
Amount: none
Color: Brown, red
Odor: None
Periwound Skin Appearance: Erythema
Wound edges: approximated
Pain (associated with wound): none at time of assessment
How does patient state this happened? pt unable to state how this happened
   Surface the patient is resting on: Proform
 
SKIN PREVENTION RECOMMENDATION:
   1.  Pressure redistribution support surface as appropriate
   2.  Elevate heels
   3.  Remove boots/TEDS every shift and reapply
   4.  Head of bed 30 degrees as tolerated
   5.  Assess nutrition and hydration
   6.  Manage moisture
   7.  Avoid the use of containment devices while in bed
   8.  Use absorptive products on surfaces limit layers of linens on bed
   9.  Turn and reposition every 1-2 hours in bed and every 1 hour in chair as
       tolerated
   10. Weight shifts every 15 minutes while up in chair
   11. Offloading with pillows or device to keep heels elevated off bed
   12. Monitor skin at least every shift
   13. Inspect under medical devices twice a day
 
WOUND TREATMENT RECOMMENDATIONS:
May need vascular follow up due to results on 8/19/19.
Continue full thickness guidelines to right posterior calf.
Heel raiser pro boots while in bed to bilateral feet.

## 2019-08-20 NOTE — NUR
Nutritional Support Services Note: Pt has a wound noted to right calf. She
receives an 1800cal diabetic diet with Glucerna OS po TID with meals. Staff to
encourage intake of all meals and supplements. No other nutrition intervention
needed at this time. Will follow as needed.       Amber Saenz Rdn Ld

## 2019-08-20 NOTE — NUR
PT TOOK MEDICATION THEN SPIT IT ALL OVER HERSELF. SHE SAID YOU CAN'T FORCE ME
TO TAKE THOSE MEDICATIONS. I INFORMED HER I WOULDN'T FORCE HER BUT SHE OPENED
HER MOUTH AND TOOK THE PUDDING WITH MEDICATION. SHE SAID THAT IS CONSIDERED
FORCE. SHE SAYS SHE DOESN'T WANT ANY MEDICINE ANYMORE.

## 2019-08-20 NOTE — NUR
PHYSICAL THERAPY
 
Physical therapy evaluation complete, 3N. Please see evaluation for full
details. Moderate complexity evaluation (57638) per chart review and
evaluation. PT to progress with bed mobility, transfers, gait per POC.
Recommend return to nursing facility at discharge. Thank you. Samantha Ballard,PT,DPT.

## 2019-08-20 NOTE — NUR
PM GROUP/LEISURE INTERESTS
PT ATTENDED AFTERNOON GROUP THERAPY AND PARTICIPATED BY READING THE NEWSPAPER
AND SOCIALIZING WITH PEERS. PT EXHIBITED NO AGITATION OR AGGRESSION DURING
GROUP.

## 2019-08-20 NOTE — NUR
Treatment Plan meeting with Dr. Bean, RN, AT, SW and Discharge Planner. Plan
for discharge next week. Pt. will return to [facility]. Clinical
Updates faxed to [facility].

## 2019-08-20 NOTE — NUR
TAKING TO HER ROOM TO GET READY FOR BED AS SHE IS DISROBING IN DININGROOM.
REDIRECTED THAT THERE ARE OTHERS HERE AND SHE DOESN'T WANT THEM TO SEE HER
NAKED. SHE REPLIED WELL WHAT IF I DO.

## 2019-08-20 NOTE — NUR
PHYSICAL THERAPY CO-SIGN
 
 
I approve of the Physical Therapy notes written above.
 
                                ADE CRUZ PT,DPT

## 2019-08-21 VITALS — DIASTOLIC BLOOD PRESSURE: 92 MMHG

## 2019-08-21 VITALS — DIASTOLIC BLOOD PRESSURE: 80 MMHG

## 2019-08-21 VITALS — SYSTOLIC BLOOD PRESSURE: 121 MMHG | DIASTOLIC BLOOD PRESSURE: 59 MMHG

## 2019-08-21 LAB
ALBUMIN SERPL-MCNC: 3.5 GM/DL (ref 3.1–4.5)
ALP SERPL-CCNC: 70 U/L (ref 45–117)
ALT SERPL W P-5'-P-CCNC: 19 U/L (ref 12–78)
AST SERPL-CCNC: 15 IU/L (ref 3–35)
BASOPHILS # BLD AUTO: 0 10*3/UL (ref 0–0.1)
BASOPHILS NFR BLD AUTO: 0 % (ref 0–1)
BUN SERPL-MCNC: 14 MG/DL (ref 7–24)
CHLORIDE SERPL-SCNC: 105 MMOL/L (ref 98–107)
CREAT SERPL-MCNC: 0.64 MG/DL (ref 0.55–1.02)
EOSINOPHIL # BLD AUTO: 0.1 10*3/UL (ref 0–0.4)
EOSINOPHIL # BLD AUTO: 1.3 % (ref 1–4)
ERYTHROCYTE [DISTWIDTH] IN BLOOD BY AUTOMATED COUNT: 13.1 % (ref 0–14.5)
HCT VFR BLD AUTO: 43.5 % (ref 37–47)
HGB BLD-MCNC: 13.9 G/DL (ref 12–16)
LYMPHOCYTES # BLD AUTO: 1.9 10*3/UL (ref 1.3–4.4)
LYMPHOCYTES NFR BLD AUTO: 21 % (ref 27–41)
MCH RBC QN AUTO: 30.8 PG (ref 27–31)
MCHC RBC AUTO-ENTMCNC: 32 G/DL (ref 33–37)
MCV RBC AUTO: 96.5 FL (ref 81–99)
MONOCYTES # BLD AUTO: 0.5 10*3/UL (ref 0.1–1)
MONOCYTES NFR BLD MANUAL: 5.4 % (ref 3–9)
NEUT #: 6.4 10*3/UL (ref 2.3–7.9)
NEUT %: 72 % (ref 47–73)
NRBC BLD QL AUTO: 0 % (ref 0–0)
PLATELET # BLD AUTO: 246 10*3/UL (ref 130–400)
PMV BLD AUTO: 9.2 FL (ref 9.6–12.3)
POTASSIUM SERPL-SCNC: 4.1 MMOL/L (ref 3.5–5.1)
PROT SERPL-MCNC: 7.2 GM/DL (ref 6.4–8.2)
RBC # BLD AUTO: 4.51 10*6/UL (ref 4.1–5.1)
SODIUM SERPL-SCNC: 138 MMOL/L (ref 136–145)
TROPONIN I SERPL-MCNC: < 0.015 NG/ML (ref ?–0.04)
WBC NRBC COR # BLD AUTO: 8.9 10*3/UL (ref 4.8–10.8)

## 2019-08-21 NOTE — NUR
case management received a call from insurance company, inpatient u stay is
approved 10 days with next review 08/28/19, approval number is [id number]

## 2019-08-21 NOTE — NUR
CALLED RESPIRATORY CELL PHONE NUMBERS, NO ANSWER, SPOKE WITH RODRI ANDRE
AT EXT 2021, HE WILL LET RT KNOW OF THE DUONED ORDER.

## 2019-08-21 NOTE — NUR
OT NOTE
Attempted to see pt this P.M. for OT session and upon arrival nursing
requested to let pt rest at this time due to vomitting. Will continue with
POC as able.
 
BINDU Santamaria/GEORGE

## 2019-08-21 NOTE — NUR
Spoke with Sadie Castro at [facility]. Notified of plans to discharge next
week. Clinical Updates faxed to Facility [phone].

## 2019-08-21 NOTE — NUR
Treatment Plan meeting with RN, SW and Discharge Planner. Plan for discharge
Next week. Pt. will return to [facility].

## 2019-08-21 NOTE — NUR
KIMBER BAKER ON UNIT TO ASSESS PT, UPDATE PROVIDED RE: RECENT TROPININ
LEVELS. NO FURTHER ORDERS AT THIS TIME.

## 2019-08-21 NOTE — NUR
NO ADVERSE MOODS OR BEHAVIORS NOTED THIS SHIFT. PT ALERT TO PERSON ONLY,
CONFUSION AND SHORT TERM MEMORY DEFICITS NOTED PER PT BASELINE. PT CALM. NO
FURTHER EMESIS OR DIARRHEA NOTED AT THIS TIME. PT MED COMPLIANT THIS AM
WITH MINIMAL DIFFICULTY. PT UP TO A GERICHAIR. PT INCONTINENT OF BOWEL AND
BLADDER. PLAN IS TO CONTINUE TO MONITOR PT BEHAVIORS ON Q15 MIN SAFETY CHECKS,
ENCOURAGE MED COMPLIANCE, PROVIDE EMOTIONAL SUPPORT AND 1:1, MONITOR VITAL
SIGNS [id number] PER ORDERS.

## 2019-08-21 NOTE — NUR
UPDATED KIMBER GILBERT ON CHEST XRAY RESULTS AND LAB RESULTS, NO FURTHER
ORDERS AT THIS TIME, WILL RE-EVALUATE AND CONTINUE TO MONITOR.

## 2019-08-21 NOTE — NUR
2ND RN APPRAOCHED PT ROOM TO CHECK BLOOD SUGAR, NOTIFIFED THIS NURSE THAT PT
WAS VOMITING AND LAYING FLAT ON HER BACK, THIS NURSE RESPONDED OBSERVED PT TO
HAVE VOMITED YELLOW, FOUL SMELLING LIQUID. ASSISTED PT INTO SITTING POSITION
WITH HOB ELEVATED. VS [id number]/73-93%RA, . KIMBER CEDENO ON UNIT
AT 1143, TO ASSESS PT, PER KIMBER SHE WILL PLACE ORDERS.  CALLED KIMBER AT
1148 ADVISED THAT PT IS CONTINUING TO VOMIT YELLOW FOULD SMELLING LIQUID. 1151
STAFF NOTIFIED THIS NURSE THAT PT HAD LARGE LOOSE BM.

## 2019-08-21 NOTE — NUR
SPEECH PATHOLOGY
Nursing requested to withhold treatment this date. Patient is ill and in bed.
Will attempt again tomorrow as appropriate.
 
REKHA MARC MS CCC-SLP

## 2019-08-21 NOTE — NUR
SLEEPING IN CHAIR. WRAPPED WITH BLANKET AND PILLOW UNDER HEAD, RESP EASY NON
LABORED. MOVING SELF AROUND

## 2019-08-21 NOTE — NUR
UPDATED KIMBER BAKER ON PT HAVING MASSIVE WATERY BROWN BM. PER KIMBER GET A
STOOL SAMPLE. NO FURTHER ORDERS AT THIS TIME.

## 2019-08-22 VITALS — DIASTOLIC BLOOD PRESSURE: 54 MMHG | SYSTOLIC BLOOD PRESSURE: 126 MMHG

## 2019-08-22 VITALS — DIASTOLIC BLOOD PRESSURE: 76 MMHG | SYSTOLIC BLOOD PRESSURE: 143 MMHG

## 2019-08-22 VITALS — DIASTOLIC BLOOD PRESSURE: 69 MMHG

## 2019-08-22 NOTE — NUR
Treatment Plan meeting held with Dr. Bean, RN, AT, SW and Discharge Planner.
Plan remains for discharge next week with return to [facility].

## 2019-08-22 NOTE — NUR
NO ADVERSE MOODS OR BEHAVIORS NOTED. MOOD HOPELESS/HELPLESS. PT ALERT TO
PERSON ONLY, CONFUSED IN ALL OTHER AREAS. PT CALM, SLEEPING SINCE BEGINNING OF
SHIFT, EASILY AROUSABLE DURING INTERACTIONS. NO FURTHER EMESIS OR DIARRHEA
NOTED AT THIS TIME. PT MEDICATION NONCOMPLIANT DUE TO SLEEPING DURING HS PASS,
UNABLE TO EDUCATE DUE TO COGNITION. PT UP TO A GERICHAIR, REPOSITIONED Q 2
HOURS.NO PHYSICAL COMPLAINTS VOICED. PT INCONTINENT OF BOWEL AND BLADDER.
RESPIRATIONS EASY AND REGULAR ON ROOM AIR, BREATHING TREATMENTS CONTINUED AS
ORDERED. PLAN IS TO CONTINUE TO MONITOR PT BEHAVIORS ON Q15 MIN SAFETY
CHECKS, ENCOURAGE MED COMPLIANCE, PROVIDE EMOTIONAL SUPPORT AND 1:1, MONITOR
VITAL SIGNS Q6 HOURS PER ORDERS.

## 2019-08-22 NOTE — NUR
SPEECH PATHOLOGY
Patient was seen for treatment this am during breakfast meal. Patient was
sitting upright in activity room with peers. She was feeding herself without
difficulty. She displayed good use of safety precautions such as slow intake
and small bites and sips. Patient displayed no overt difficulty with any item.
Her intake was good. As she is tolerating regular diet, using strategies and
recommended to remain on regular diet and thin liquid, continued dysphagia
therapy is no longer warranted. Patient will be discharged at this time. Thank
you for this referall. It has been a pleasure taking part in this patient's
care.
 
REKHA MARC MSCCC-SLP

## 2019-08-22 NOTE — NUR
Late Entry: Met with pt's  and son yesterday during afternoon
visitation. Pt was also present but was sleeping in the williams-chair. Discussed
pt's current status. Answered questions from pt's family regarding course of
care. Discussed progression of illness. Confirmed discharge plan of pt
returning to [facility].

## 2019-08-22 NOTE — NUR
AM GROUP/LESSONS FROM A TREE
PT WAS PRESENT FOR MORNING GROUP THERAPY HAVING JUST RETURNED FROM TESTING. PT
DID NOT PARTICIPATE IN GROUP. PT WAS RECLINED IN A FRANK CHAIR SLEEPING.

## 2019-08-22 NOTE — NUR
OT NOTE
Pt was seen this A.M. 1:1 for 20 minute OT session with nursing staff present
for observation only. Upon arrival pt was sitting semi reclined in the williams
chair in the dining room. Pt identified by name and  on wristband. Pt's
breakfast tray arrived which she was sat upright in the williams chair with lap
tray in place. Pt required maxA for set up of tray due to being unable to cut
her food and open containers. After set up pt completed self feeding using a
fork and managed all cups with straws with SBA. Pt was left sitting upright in
the williams chair in the dining room with body alarm on for safety, under U
staff supervision, and with lap tray on. Continue with rec D/C plan to return
to LTC.
 
BINDU Santamaria/GEORGE

## 2019-08-22 NOTE — NUR
PM GROUP/TREES CONTINUED
PT WAS PRESENT FOR AFTERNOON GROUP THERAPY BUT DID NOT PARTICIPATE. PT WAS
RECLINED IN A FRANK CHAIR AND SLEPT MOST OF THE TIME

## 2019-08-22 NOTE — NUR
PATIENT OBSERVED ON Q 15 MIN CHECKS TO HAVE SLEPT APPROX 8 HOURS INTERRUPTED
WITH MULTIPLE BRIEF AWAKENINGS NOTED. NO SIGNS OR SYMPTOMS OF DISTRESS NOTED.

## 2019-08-22 NOTE — NUR
Patient resting quietly with no c/o discomfort. Respirations easy and regular.
Vital signs stable. No overt distress.
 
HECTRO ELLIOTT

## 2019-08-23 VITALS — SYSTOLIC BLOOD PRESSURE: 130 MMHG | DIASTOLIC BLOOD PRESSURE: 68 MMHG

## 2019-08-23 VITALS — DIASTOLIC BLOOD PRESSURE: 62 MMHG

## 2019-08-23 VITALS — DIASTOLIC BLOOD PRESSURE: 69 MMHG

## 2019-08-23 NOTE — NUR
Patient resting quietly with no c/o discomfort. Respirations easy and regular.
Vital signs stable. No overt distress.
 
HECTOR ELLIOTT

## 2019-08-23 NOTE — NUR
PM GROUP/MANICURES AND MUSIC
PT WAS PRESENT FOR AFTERNOON GROUP THERAPY RECLINED IN A FRANK CHAIR SLEEPING.
PT DID NOT WAKE DURING GROUP

## 2019-08-23 NOTE — NUR
PATIENT OBSERVED ON Q 15 MIN CHECKS TO HAVE SLEPT APPROX 4 HOURS WITH MULTIPLE
BRIEF AWAKENINGS NOTED THROUGHOUT THE NIGHT. NO SIGNS OR SYMPTOMS OF DISTRESS
NOTED.

## 2019-08-23 NOTE — NUR
PT HAS DISPLAYED NO ADVERSE MOOD OR BEHAVIORS THIS SHIFT. PT IS CONFUSED.
ORIENTED TO PERSON ONLY. PT CAN APPROPRIATELY RESPOND TO VERBAL CUES WITH
BRIEF ANSWERS, ST/LT MEMORY DEFICITS APPARENT. PT STATES SHE "HAD A GOOD NIGHT
AND A GOOD DAY, AND NOW SHE IS GOING DOWNSTAIRS". PT REDIRECTED AND ADVISED
THAT THIS UNIT IS ONLY ONE LEVEL, PT STATES "OH, WHERE ARE WE?" PT PROVIDED
WITH REORIENTATION AS APPROPRIATE. PROVIDED WITH 1:1 AND LOW STIMULATION.
MEDICATION COMPLIANT WITHOUT DIFFICULTY. PT APPETITE HAS IMPROVED FROM
YESTERDAY'S ASSESSMENT. WILL CONTINUE TO MONITOR APPETITE AND SLEEP QUALITY.
WILL CONTINUE TO PROVIDE REORIENTATION AS APPROPRIATE. Q15 MIN MONITORING FOR
SAFETY.

## 2019-08-23 NOTE — NUR
OT NOTE
Pt was seen this A.M. 1:1 for 15 minute OT session with PTA and nursing staff
present for observation only. Upon arrival pt was sitting upright in the williams
chair in the dining room. Pt identified by name and  and had no complaints
at this time. Pt was taken out into the hallway where she completed multiple
sit to stand transfers from chair level with Linda and use of hand rail for UE
support. Challenged pt's static standing tolerance needed for increased I in
self care tasks and functional transfers. Pt was able to tolerate aprox 60
seconds at a time before sitting due to fatigue. Functional mobility completed
into the bathroom with Linda HHA there she transferred on/off standard commode
with Linda and use of grab bar for UE support. Functional mobility completed
back to the williams chair. Throughout all transfers and mobility pt required max
verbal prompts keeping a wider base of support, pt had poor carry over
throughout. Pt was left sitting upright in the williams chair in the dining room
with body alarm on for safety, lap tray in place, and under [facility] staff
supervision. Continue with rec D/C plan to return to LTC.
 
BINDU Santamaria/GEORGE

## 2019-08-23 NOTE — NUR
P-PATIENT ALERT AND ORIENTED TO SELF, CONFUSED IN ALL OTHER AREAS. ST/LT
DEFICITS NOTED. MOOD LABILE, HOPELESS/HELPLESS. PT CALM WITH UNDERLYING
IRRITABILITY, REFUSED 2000 BP VITAL CHECK AND BSG AFTER X2 ATTEMPTS, GESTURES
TOWARDS STAFF WITH FISTS AND STATES TO LEAVE HER ALONE. PT ALSO REFUSED HS
MEDICATIONS AFTER X3 ATTEMPTS, PT SPIT THEM OUT IN HER BLANKET. PT'S APPEITTE
CONTINUES TO BE POOR THIS SHIFT DESPITE ENCOURAGEMENT.
 
 
I-PATIENT REORIENTED TO REALITY AND REDIRECTED. PROVIDED WITH 1:1 FOR
VENTILATION OF FEELINGS WITH EMOTIONAL SUPPORT AS NEEDED. ENCOURAGED
MEDICATION COMPLIANCE AND EDUCATED. REITERATED IMPORTANCE OF FLUID AND FOOD
INTAKE AND CONTINUE TO ENCOURAGE PT.
 
R- PATIENT UNRECEPTIVE TO THERAPUETIC INTERVENTIONS AND MEDICATION EDUCATION
WHEN PRESENTED DUE TO COGNITION, ALL NEEDS ANTICIPATED BY STAFF, INCONTINENT
CARE PROVIDED WITHOUT DIFFICULTY. FLUID AND FOOD INTAKE REMAINS POOR, PT
STATED SHE DOES NOT WANT TO EAT OR DRINK ANYTHING BECAUSE SHE WILL GET FAT,
UNABLE TO BE REDIRECTED. VITALS RECHECKED AT O200 AS ORDERED, NO COMBATIVE
BEHAVIOR NOTED. NO PHYSICAL COMPLAINTS VOICED. PT CURRENTLY LAYING DOWN WITH
EYES CLOSED, RESPIRATIONS EASY AND REGULAR, NO SIGNS OR SYMPTOMS OF DISTRESS
NOTED.
 
P-CONTINUE TO MONTIOR MOODS AND BEHAVIORS. PROVIDE 1:1 WITH EMOTIONAL
SUPPORT. ENCOURAGE MEDICATION COMPLIANCE AND EDUCATE. ENCOURAGE FLUIDS AND
FOOD INTAKE AND EDUCATE. PROVIDE REALITY ORIENTATION AND REDIRECTION AS
NEEDED. MAINTAIN Q 15 MIN CHECKS.

## 2019-08-23 NOTE — NUR
AM GROUP
PT WAS PRESENT FOR MORNING GROUP THERAPY RECLINED IN A FRANK CHAIR SLEEPING. PT
AWOKE AND QUIETLY OBSERVED THE GROUP. PT WAS SET UPRIGHT AND BROUGHT TO THE
TABLE WITH PEERS AND STATED, "I CAN'T STAY HERE, I NEED TO GO TO SEE MY
MOTHER" PT WAS EASILY REDIRECTED AND EXHIBITED NO AGIATION OR AGGRESSION.

## 2019-08-23 NOTE — NUR
Treatment Plan meeting with Dr. Bean, RN, AT, SW and Discharge Planner. Plan
for discharge Next week. Pt. will return to [facility]. Clinical Updates faxed
to facility. Spoke with Sadie Castro from [facility] and notified her of
discharge plans.

## 2019-08-24 VITALS — SYSTOLIC BLOOD PRESSURE: 132 MMHG | DIASTOLIC BLOOD PRESSURE: 65 MMHG

## 2019-08-24 VITALS — SYSTOLIC BLOOD PRESSURE: 130 MMHG | DIASTOLIC BLOOD PRESSURE: 68 MMHG

## 2019-08-24 VITALS — DIASTOLIC BLOOD PRESSURE: 78 MMHG

## 2019-08-24 NOTE — NUR
PT NON- COMPLIANT WITH MEDICATION ADMINISTRATION, ATTEMPTED TO ADMINISTER
RISPERDAL M-TAB, PT PROCEEDED TO SWISH HER MOUTH WITH ORANGE JUICE AND SPIT IT
OUT IN A BLANKET ON HER LAP, UNABLE TO REDIRECT AT THIS TIME. PT ALSO OFFERED
MEDICATIONS IN CHOCOLATE PUDDING IN WHICH SHE ALSO SWISHED AND SWALLED
QUANTITY.
 
CONTINUE TO ENCOURAGE MEDICATIONS AT THIS TIME. PT IS NOTED TO BE TALKING TO
HER SELF OR UNSEEN OTHERS AT THIS TIME.

## 2019-08-24 NOTE — NUR
DR PITT UPDATED ABOUT PATIENT LG EMESIS X2 . EMESIS WITH THICK PHLEGM MIXED
WITH BROWN LIQUID AND FOOD CHUNCKS THROUGHOUT AND WITH STRONG VANILLA ODOR.
PATIENT STATES "I THINK IT WAS THE GLUCERNA I DRANK. IT DID NOT AGREE WITH
ME". PATIENT SITTING UP AT THIS TIME. +BOWEL SOUNDS X 4 QUADRANTS. LUNGS
DIMINISHED IN BILATERAL BASES. NO NEW ORDERS GIVEN AT THIS TIME.

## 2019-08-24 NOTE — NUR
AM GROUP/EXERCISES/AROMATHERAPY/BINGO
PT ATTENDED GROUP AND PARTICIPATED THROUGH OBSERVATION. AT TIMES PT EXPRESSIVE
OF WANTING TO LEAVE UNIT. PT
REDIRECTION UTILIZED. PT ALSO EXPRESS "THAT MINT SMELL YOU HAVE IN THE ROOM
SMELLS VERY NICE" PT WILL CONTINUE TO BE ENCOURAGED TO ATTEND AND PARTICIPATE
TO THE BEST OF PT ABILITY IN FUTURE GROUP SESSIONS.

## 2019-08-24 NOTE — NUR
C/O BEING NAUSEATED AND THROWING UP TODAY. OFFERED GINGERALE WHICH SHE TOOK.
REFUSING SNACKS AND MEDICATION. WILL CONTINUE TO MONITOR

## 2019-08-24 NOTE — NUR
STATES GINGER-HOSSEIN HELPED SO MUCH BUT REFUSES SNACK AND MEDICATIONS. STATES SHE
WANTS TO WAIT TILL TOMORROW.

## 2019-08-24 NOTE — NUR
PT NON COMPLIANT WITH MEDICATION PASS, PT SPIT MEDICATIONS OUT, ALL OVER
CLOTHING, AFTER PT HAD SWISHED MEDICATIONS IN MOUTH, REFUSING TO SWALLOW. SHE
DID ATTEMPT TO SWAT AT STAFF WHILE REDIRECTING PT TO SWALLOW. UNABLE TO
REDIRECT. PT DID FALL ASLEEP SHORTLY AFTER 2100 MEDICATION PASS. WOULD NOT
SPEAK TO NURSE. NO SI/HI OR DELUSIONS NOTED

## 2019-08-24 NOTE — NUR
P-CONFUSION. PATIENT ALERT WITH CONFUSION. PATIENT WITH SHORT TERM AND LONG
TERM MEMORY DEFICITS. PATIENT WITH NO RESPIRATORY DISTRESS. PATIENT WITH NO
HALLUCINATIONS OR DELUSIONS. PATIENT DENIES SUICIDAL OR HOMICIDAL IDEATIONS.
 
I-REDIRECTION WITH 1:1 THERAPEUTIC INTERVENTIONS AND PRESENT REALITY. EDUCATE
AND ENCOURAGE MEDICATION COMPLIANCE.
 
R-PATIENT MEDICATION COMPLIANT WITH SOME OF AM MEDICATIONS WITH CUEING.
PATIENT ISOLATIVE IN DINING AREA AROUND PEERS. PATIENT TALKING TO NURSE WITH
SOFT VOICE. PATIENT ABLE TO MAKE NEEDS KNOWN.
 
P-CONTINUE TO ENCOURAGE MEDICATION, CONTINUE TO PRESENT REALITY, ENCOURAGE
GROUP THERAPY WHILE AWAKE

## 2019-08-25 VITALS — DIASTOLIC BLOOD PRESSURE: 87 MMHG

## 2019-08-25 VITALS — DIASTOLIC BLOOD PRESSURE: 88 MMHG | SYSTOLIC BLOOD PRESSURE: 136 MMHG

## 2019-08-25 NOTE — NUR
PT RESTING QUIETLY WITH EYES CLOSED. RESPS EASY AND EVEN ON ROOM AIR.
AROUSABLE VIA VERBAL/TACTILE STIMULI. NO DISTRESS NOTED.

## 2019-08-25 NOTE — NUR
P--CONFUSION/ NONCOMPLIANCE WITH MEDICATION
I--STAFF ATTEMPTING TO FEED SNACK TO CLIENT. REORIENTATION PROVIDED WITHOUT
SUCCESS. REFUSES 1:1 WITH ANY STAFF MEMBER. EMOTIONAL SUPPORT PROVIDED.
MEDICATION EDUCATION PROVIDED
R--CLIENT SPIT OUT SNACK. STATES I CAN'T EAT THAT STUFF. WON'T SAY WHAT SHE
WILL EAT.  MOVING ALL EXTREMETIES. AGGRESSIVE WITH ALL HOC FROM STAFF
P--CONTINUE REORIENTATION. EMOTIONAL SUPPORT. MONITOR FOR SAFETY AND CHANGE IN
BEHAVIORS

## 2019-08-25 NOTE — NUR
ATIVAN GIVEN AS CLIENT IS YELLING OUT FOR GALINDO, LOOKING FOR , CLIMBING
OUT OF BED AND CHAIR. HITTING AT STAFF. UNCONSOLABLE WITH OTHER
NONPHARMACEUTICALS. ATIVAN PO NOT ATTEMPTED AS SHE HAS NOT TAKEN ANY
MEDICATION BY MOUTH FOR DAYS. MILIEUS IN WITH CLIENT. WILL MONITOR

## 2019-08-25 NOTE — NUR
TRIED THICKENING PRUNE JUICE AS CLIENT WON'T TAKE ANYTHING BY MOUTH AND HER
BOWELS HAVENT MOVED SINCE 8/22/19. WILL PASS ON

## 2019-08-25 NOTE — NUR
P- NAPPING INTERMITTENTLY. EASILY AROUSABLE VIA VERBAL/TACTILE STIMULI.
CONFUSED. NO AGGRESSIVE/COMBATIVE BEHAVIORS. REFUSED LUNCH, HELD FOOD/FLUID IN
MOUTH, SWISHED AROUND AND SPIT OUT.
I- ORIENTATION, MOOD AND BEHAVIOR ASSESSED. ASSESSED PT FOR SI/HI, INTENT OR
PLAN. ASSESSED PT FOR S/S HALLUCINATIONS, PARANOIA AND/OR DELUSIONS.
MEDICATIONS ADMINISTERED AS PER PHYSICIAN'S ORDERS. ASSISTANCE WITH ADL CARE
PROVIDED AS NEEDED. ENCOURAGED PT TO ATTEND AND PARTICIPATE IN S[facility]
GROUPS AND ACTIVITIES.
R- PT IS ALERT AND ORIENTED TO SELF ONLY, CONFUSION NOTED IN OTHER AREAS.
MEMORY GAPS NOTED. RESPS EASY AND EVEN ON ROOM AIR. MOOD APPEARS STABLE.
AFFECT FLAT. PT NAPPING INTERMITTENTLY T/O SHIFT, EASILY AROUSABLE VIA
VERBAL/TACTILE SITMULI. PLEASANT WITH STAFF. PT DENIES SI/HI, INTENT OR PLAN.
PT DENIES HALLUCINATIONS, NO RESPONSE TO INTERNAL STIMULI NOTED. AM MEDS HELD
D/T PT SLEEPING. PT REFUSED PM MEDICATIONS AND LUNCH, PT HOLDING FOOD/FLUIDS
IN MOUTH, SWISHING AROUND THEN SPITTING OUT. PT'S  STATES THIS BEHAVIOR
HAS BEEN ONGOING FOR PT. NO AGGRESSIVE OR COMBATIVE BEHAVIORS THIS SHIFT. NO
DISTRESS NOTED.
P- PLAN TO CONTINUE CURRENT TREATMENT, CONTINUE TO MONITOR MOOD AND BEHAVIORS,
PROVIDE APPROPRIATE REORIENTATION, REDIRECTION AND 1:1 AS NEEDED. CONTINUE TO
ENCOURAGE MEDICATION COMPLIANCE AS WELL AS GROUP ATTENDANCE AND PARTICIPATION.

## 2019-08-25 NOTE — NUR
ON UNIT TO SEE PT AT THIS TIME. MADE AWARE PT FREQUENTLY REFUSING
MEDS INCLUDING KEPPRA. POCKETS FOOD AND MEDS AT TIMES AND THEN SPITS OUT. MADE
AWARE PT HAD SPEECH CONSULT AFTER ADMISSION FROM MEDICAL FLOOR D/T SAME
BEHAVIOR. NNO RECIEVED AT THIS TIME.

## 2019-08-25 NOTE — NUR
REFUSED SNACK AND MEDICATIONS. SPIT OUT ANYTHING GIVEN TO HER. MOVING SELF IN
CHAIR. COMBATIVE WITH HANDS ON CARE.  FELL ASLEEP IN CHAIR THEN PLACED IN BED.
MONITOR ALARMS ON. WILL MONITOR FOR SAFETY AND CHANGES IN MOOD OR BEHAVIOR

## 2019-08-26 VITALS — DIASTOLIC BLOOD PRESSURE: 75 MMHG | SYSTOLIC BLOOD PRESSURE: 139 MMHG

## 2019-08-26 VITALS — DIASTOLIC BLOOD PRESSURE: 76 MMHG

## 2019-08-26 NOTE — NUR
P- MEDICATION NONCOMPLIANCE. CONTINUES TO POCKET FOOD/FLUIDS AT TIMES. NO
AGGRESSIVE BEHAVIORS THIS SHIFT.
I- ORIENTATION, MOOD AND BEHAVIOR ASSESSED. ASSESSED PT FOR SI/HI, INTENT OR
PLAN. ASSESSED PT FOR S/S HALLUCINATIONS, PARANOIA AND/OR DELUSIONS.
MEDICATIONS ADMINISTERED AS PER PHYSICIAN'S ORDERS. ASSISTANCE WITH ADL CARE
PROVIDED AS NEEDED. ENCOURAGED PT TO ATTEND AND PARTICIPATE IN S[facility]
GROUPS AND ACTIVITIES.
R- PT IS ALERT AND ORIENTED TO NAME ONLY. CONFUSED IN ALL OTHER AREAS. MEMORY
GAPS NOTED. RESPS EASY AND EVEN ON ROOM AIR. MOOD APPEARS STABLE WITH FLAT
AFFECT. SPEECH IS SOFT, ABLE TO ANSWER SIMPLE QUESTIONS. PT DENIES SI/HI,
INTENT OR PLAN. PT DENIES HALLUCINATIONS, NO RESPONSE TO INTERNAL STIMULI
NOTED. NO PARANOIA OR DELUSIONS NOTED. PT REMAINS NONCOMPLIANT WITH
MEDICATIONS AT TIMES. TOOK AM DOSE OF KEPPRA WITH MUCH ENCOURAGEMENT. PT
CONTINUES TO POCKET FOOD/FLUIDS AT TIMES. SPEECH THERAPIST FOLLOWING PT. NO
AGGRESSIVE BEHAVIORS NOTED. NO DISTRESS NOTED.
P- PLAN TO CONTINUE CURRENT TREATMENT, CONTINUE TO MONITOR MOOD AND BEHAVIORS,
PROVIDE APPROPRIATE REORIENTATION, REDIRECTION AND 1:1 AS NEEDED. ENCOURAGE
MEDICATION COMPLIANCE AS WELL AS GROUP ATTENDANCE AND PARTICIPATION.

## 2019-08-26 NOTE — NUR
SPEECH PATHOLOGY
Clinical swallowing evaluation completed as per orders. Patient has been
pocketing foods, holding liquids and spitting out food/liquid.  Patient is
known to this dept. as she was seen last week, after experiencing the same
problems.  When seen during therapy, she displayed no oral or pharyngeal
swallowing difficulty and therapy had been discontinued. Reports indicate that
patient has currently been refusing and spitting out medications, refusing and
holding/pocketing meals and having bowel issues with no bowel movements over
several days. Assessment was conducted during lunchtime meal. Patient was
alert, sitting upright in chair in room with peers. Patient was cooperative
during the encounter and able to self feed. Patient displayed no overt
difficulty at this time. She swallowed food and liquid in a timely manner and
there were no instances of holding/pocketing or spitting out food. Recommend
patient remain on present diet. Recommend she is monitored at mealtime to
ensure safe tolerance and use of universal safe swallow precautions.
Recommend cues to swallow as needed and giving patient smooth foods that are
easier to swallow during times when she is holding/pocketing food. Reports
state that patient is now receiving some medications mixed with liquids, to
improve her compliance with them and this has reportedly been effective.
Follow up dysphagia therapy is recommended to ensure safe tolerance of meals
through education and use of safety strategies. Results and satish. were shared
with patient's nurse who verbalized understanding. Refer to report in [facility]
for further information. Thank you for this referral.
 
REKHA MARC MSCCC-SLP

## 2019-08-26 NOTE — NUR
EVENING/RELAXTION/POSITIVE QUOTES
PT ATTENDED FIRST FEW MINUTES OF GROUP AND ASKED THIS STAFF "ARE YOU TRYING TO
KILL ME?
 I FEEL LIKE YOU ARE TRYING TO KILL ME AND EVERYONE IN THIS ROOM" THIS
STAFF ASSURED PT THAT5 THIS STAFF WILL NOT KILL HER OR ANYONE THAT WE ARE JUST
DOING ACTIVITIES. PT THEN ASKED TO LEAVE THE ROOM. PT DID NOT RETURN TO GROUP.
PT WILL CONTINUE TO BE ENCOURAGED TO ATTEND AN DPARTICIPATE TO BEST OF PT
ABILITY.

## 2019-08-26 NOTE — NUR
Spoke with Sadie Castro at [facility]. Advised of plans to discharge at
the end of the week. Clinical Updates faxed to Facility.

## 2019-08-26 NOTE — NUR
PHYSICAL THERAPY
 
Patient seen this am for therapy visit and was sitting semi reclined in
activity room Emilee chair upon therapist arrival. OT assistant was present for
observation only during PTA treatment as patient reports no c/o's pain.
Patient was very pleasant this morning transfering  sit to stand at rail with
MIN A x several attempts, tolerating 1-2 minutes static stand each trial.
Patient ambulates HHA/MIN with single handrail support, 20'x 1, demosntrating
"scissoring" gait pattern, decreased stride and Poor upright posture. Patient
also able to take 5-6 steps backwards without LOB and returned to Emilee chair
in activity room with body alarm. Patient remained under U staff Supervision
and will continue per POC as tolerted, total treatment time 16 minutes.
Shelton Maldonado, PTA

## 2019-08-26 NOTE — NUR
Treatment Plan meeting with Dr. Bean, RN, AT, SW and Discharge Planner. Plan
for discharge at the end of the week with return to [facility].

## 2019-08-26 NOTE — NUR
PT RESISTIVE WITH MEDICATIONS, PRESENTED KEPPRA IN SMALL AMOUNT OF PUDDING, PT
DID SWALLOW WITH PROMPTING. ATTEMPTED TO GIVE HER CEFTIN IN WHICH SHE TOOK
HALF OF IT AND SPIT OUT THE OTHER HALF. PT DIFFICULT TO REDIRECT, CONTINUE TO
REAPPROACH AS NEEDED. PT REFUSED ALL OTHER MEDICATIONS.
 
PT DID ATTEMPT TO STRIKE OUT AT STAFF WHILE GIVING HOC, STATING "I CAN DO WHAT
I WANT TO DO" UNABLE TO REDIRECT.

## 2019-08-26 NOTE — NUR
PM GROUP/MUSIC/ART
PT ATTENDED BUT DID NOT PARTICIPATE DUE TO SLEEPING ON AND OFF IN RECLINER. PT
DID NOT EXPRESS ANY ANXIETY OR ANGER WHEN AWAKE. PT WILL CONTINUE TO ATTEND
AND BE ENCOURAGED TO PARTICIPATE TO BEST OF PT ABILITY.

## 2019-08-26 NOTE — NUR
PT AWAKE AND ALERT. FEEDING SELF BREAKFAST IN DINING ROOM WITH PEERS. RESPS
EASY AND EVEN ON ROOM AIR. NO DISTRESS NOTED.

## 2019-08-26 NOTE — NUR
DR.PRICE ON UNIT TO SEE PT AT THIS TIME. GAVE ORDER TO HAVE SPEECH REEVAL PT
AS PT CONTINUES TO POCKET FOOD/LIQUIDS AT TIMES.

## 2019-08-26 NOTE — NUR
PT YELLING OUT "HELP ME I HAVE A BABY IN MY BELLY" PT GIVEN REDIRECTION BY
STAFF, TAKEN TO HER ROOM FOR HOC. PT CALMED LAYING IN BED AT THIS TIME

## 2019-08-26 NOTE — NUR
OT NOTE
Pt was seen this A.M. 1:1 for 25 minute OT session with PTA and nursing staff
present for observation only. Upon arrival pt was sitting upright in the williams
chair in the dining room. Pt identified by name and  and had no complaints
at this time. Pt was taken out into the hallway where she completed multiple
sit to stand transfers from chair level with Linda and use of hand rail for UE
support. Challenged pt's static standing tolerance needed for increased I in
self care tasks and functional transfers. Pt was able to tolerate aprox 2
minutes at a time before sitting due to fatigue. Pt was taken back into the
dining room where she sat the table upright in her williams chair. Pt's breakfast
tray arrived which she required maxA for set up of tray. When tray was first
given to the pt she initally attempted to eat with the knife. Corrected pt and
placed the fork in her hand and was then able to complete self feeding with
supervision. Pt was left sitting upright in the dining room in her williams chair
with body alarm on for safety and [facility]. Continue with
rec D/C plan to return to [facility].
 
BINDU Santamaria/GEORGE

## 2019-08-26 NOTE — NUR
ON UNIT TO SEE PT AT THIS TIME. MADE AWARE PT WITHOUT A DOCUMENTED
BM X4 DAYS AS OF TODAY. MADE AWARE PT DID RECIEVE MIRALAX THIS AM. WILL CONT
TO MONITOR FOR EFFECTIVENESS. NNO RECIEVED AT THIS TIME.

## 2019-08-27 VITALS — DIASTOLIC BLOOD PRESSURE: 84 MMHG

## 2019-08-27 VITALS — DIASTOLIC BLOOD PRESSURE: 69 MMHG | SYSTOLIC BLOOD PRESSURE: 150 MMHG

## 2019-08-27 NOTE — NUR
PM GROUP/LEISURE INTERESTS
PT ATTENDED AFTERNOON GROUP THERAPY AND PARTICIPATED BY COLORING FOR ABOUT AN
HOUR AND THEN BEGAN SUNDOWNING. PT CONTINUED TO TRY TO GET UP ON HER OWN
BECAUSE "I HAVE TO GET HOME TO COOK FOR MY  AND MY KIDS." PT COULD NOT
BE REDIRECTED AND WAS PLACED IN FRONT OF THE NURSES STATION FOR SAFETY.

## 2019-08-27 NOTE — NUR
AM GROUP/CURRENT EVENTS
PT WAS PRESENT FOR MORNING GROUP THERAPY SITTING UPRIGHT IN A FRANK CHAIR. PT
NODDED OFF DURING GROUP AND WAS RECLINED FOR COMFORT. PT THANKED THIS WRITER
AND WENT BACK TO SLEEP. PT IS UNABLE TO PARTICIPATE AT THIS TIME DUE TO
COGNITIVE IMPAIRMENT.

## 2019-08-27 NOTE — NUR
SPEECH PATHOLOGY
Patient was seen for swallowing treatment this date, during portions of both
breakfast and lunchtime meals. For breakfast, she was sitting upright in
wheelchair, feeding herself. Patient was observed tolerating various food and
liquids with no overt difficulty. Good intake was displayed. No pocketing,
holding or spitting of food was observed. During lunch she was noted to be
alert, sitting upright in chair and easily distracted. She drank only ginger
ale. Food was cut up and encouragement was provided to eat but patient
politely refused any food item, or any liquid, other than ginger ale. When
asked if she wanted anything to eat, she shook her head no. When asked if she
felt full she nodded yes. She was shown all food items on tray but would not
consume any, but continued to drink ginger ale with no difficulty. Recommend
she continue with current diet, with encouragment provided as needed and
monitoring during meals to ensure that she is swallowing food and liquids.
Continue therapy plan.
 
REKHA MARC MSCCC-SLP

## 2019-08-27 NOTE — NUR
OT NOTE
Pt was seen this A.M. 1:1 for 30 minute OT session with PTA and nursing staff
present for observation only. Upon arrival pt was sitting reclined in the williams
chair in the dining room. Pt identified by name and  and had no complaints
at this time. Pt was taken out into the hallway where she completed multiple
sit to stand transfers from chair level with Linda and use of w/w for UE
support. Challenged pt's static standing tolerance needed for increased I in
self care tasks and functional transfers. Pt was able to tolerate aprox 2
minutes at a time before sitting due to fatigue. After talking with [facility] staff
about pt sitting up in a w/c versus the williams chair, they approved. Pt was then
sat upright in the w/c. Educated pt on w/c mobility and pt was much more
dominate with R hand versus L resulting in pt turning in circles versus a
straight line as requested. Sat with pt during breakfast and she was able to
maintain upright misline posture throughout with no verbal prompts required.
Pt required maxA for set up of tray and was able to complete self feeding with
supervision while managing utensils and cups. Pt was left sitting upright in
the w/c in the dining room with body alarm on for safety and under [facility] staff
supervision. Continue with rec D/C plan to return to LTC.
 
BINDU Santamaria/GEORGE

## 2019-08-27 NOTE — NUR
P: PT RESTLESS, YELLING OUT AT TIMES. PT DELUSIONAL THINKING SHE NEEDS TO GET
HOME AND FEED HER CHILDREN. PT PARARNOID, LOOKING AT MALE PEER AND STATING HES
GOING TO KILL EVERYONE, I KNOW IT. PT REFUSED PART OF AM PO MEDS AND 1300
MEDS.
 
I: PROVIDED EMOTIONAL SUPPORT AND 1:1 FOR PT TO VOICE FEELINGS, ENCOURAGED MED
COMPLIANCE, PROVIDED REASSURANCE OF SAFETY, PROVIDED RE-ORIENTATION AND
PRESENTATION OF REALITY
 
R: PT CONTINUES TO YELL OUT, REORIENTATION AND PRESENTATION OF REALITY
INEFFECTIVE DUE TO COGNITION. PT CONTINUES TO REFUSE MEDS. PT ALERT TO PERSON
ONLY, CONFUSION AND SHORT TERM MEMORY DEFICITS NOTED PER PT BASELINE. NO
SUICIDAL THOUGHTS OR BEHAVIORS NOTED. PT UP TO A GERICHAIR REQUIRES 1 STAFF
ASSIST FOR TRANSFERS AND CARE. PT CONTINENT OF BOWEL AND BLADDER, EPISODES OF
INCONTINENCE NOTED, CARE PROVIDED AS NEEDED.
 
P: CONTINUE TO MONITOR PT BEHAVIORS ON Q15 MIN SAFETY CHECKS, ENCOURAGE MED
COMPLIANCE, CONTINUE TO PROVIDE REASSURANCE OF SAFETY, PRESENT REALITY AND
RE-ORIENT, PROVIDE EMOTIONAL SUPPORT AND 1:1 FOR PT TO VOICE FEELINGS

## 2019-08-27 NOTE — NUR
PHYSICAL THERAPY
 
Patient seen this am for therapy visit and was semi reclined in activity room
Emilee chair upon therapist arrival. OT assistant was present for observation
only during PTA treatment as patient reports no c/o's pain at this time.
Patient was pleasant this morning but a little tired since awakening and
introduced to wh walker this session for gait training. Patient transfers sit
to stand with MIN A and ambulated 20'x 2, Min A, use of wh walker,
demonstrating narrow base of support and decreased stride. Patient needed v/c
to keep feet apart while taking BIG steps and was able to slightly improve
lasha. Patient also demonstrated improved upright posture and standing
balance with use of walker versus hand held assist during gait ex. Patient is
still unsteady during all turns, requiring therapist assist with walker
safety. Patient returned to w/c per OT assistant request and remained in
activity room with body alarm, under [facility] staff Supervision. Will continue per
POC as tolerated, total treatment time 15 minutes. Shelton Maldonado, PTA

## 2019-08-27 NOTE — NUR
Patient alert and oriented to self only with confusion noted. ST/LT memory
deficits noted. Patient compliant with medications and much encouragement.
Assessed patient's mouth afterward to check for pocketing any medication and
mouth was entirely clear. Provided emotionaly support as much as patient would
allow. Plan to continue to encourage medication compliance and continue to
provide emotional support. Q 15 minute safety checks continued and maintained.
See [facility] flowsheet for further documentation.

## 2019-08-27 NOTE — NUR
EVENING. PT IN ACTIVITIES OBSERVING. PT DID EXPRESS SOME ANGER
TOWARDS THIS STAFF. PT STATES "I DON'T KNOW WHAT YOU ARE BUT YOU SURE AREN'T A
LADY". PT EXPRESSING ANXIETY OVER HER  COMING TO GET HER. PT OFFERED
COMFORT. PT CHOSE TO RELAX THE REMAINDER OF GROUP. PT WILL CONTINUE TO ATTEND
FUTURE GROUP SESSIONS AND ENCOURAGED TO PARTICIPATE TO BEST OF ABILITY.

## 2019-08-27 NOTE — NUR
P: ST/LT MEMORY DEFICIT, INTERMITTENT EXPLOSIVE DISORDER, COMBATIVE, NON
COMPIANT WITH MEDICATIONS, POOR INTAKE OF FOODS AND FLUIDS
I: REAPPROACH FREQUENTLY WITH MEDICATIONS AND FOODS TO ENCOURAGE COMPLIANCE,
EXPLAIN STEPS IN CARE TO PT TO ATTEMPT TO MAINTAIN CALM NON COMBATIVE
BEHAVIORS DURING HOC AND INTERACTIONS, ATTEMPT TO EXPLAIN IMPORTANCE OF
MEDICATIONS
R: PT CONTINUES TO POUCH LIQUIDS AND SOLIDS AND SWISH IT IN HER MOUTH AND SPIT
IT OUT. PT WILL SPIT IT IN BLANKETS, ON HER SHIRT AND AT STAFF, WHILE BRUSHING
PT TEETH SHE ATTEMPTED TO POKE AND "SLASH" AT STAFF WITH TOOTH BRUSH. PT
REMAINS NON COMPLIANT AND UNABLE TO REDIRECT
P: CONTINUE TO GET MEDICATIONS AND FOOD INTAKE TO INCREASE IN CONSISTENCY. NO
SI NOTED, PT DOES STATES SHE WANT'ST STAFF TO DIE, WILL TALK TO HERSELF OR
UNSEEN OTHERS AT TIMES AND YELL OUT. PT HAS BEEN LYING IN BED WITH EYES OPEN
STARING INTO THE HALLWAY THROUGHOUT THE NIGHT.

## 2019-08-28 VITALS — DIASTOLIC BLOOD PRESSURE: 90 MMHG

## 2019-08-28 VITALS — DIASTOLIC BLOOD PRESSURE: 68 MMHG

## 2019-08-28 LAB
ALBUMIN SERPL-MCNC: 3.3 GM/DL (ref 3.1–4.5)
ALP SERPL-CCNC: 72 U/L (ref 45–117)
ALT SERPL W P-5'-P-CCNC: 13 U/L (ref 12–78)
AST SERPL-CCNC: 12 IU/L (ref 3–35)
BASOPHILS # BLD AUTO: 0 10*3/UL (ref 0–0.1)
BASOPHILS NFR BLD AUTO: 0.1 % (ref 0–1)
BUN SERPL-MCNC: 12 MG/DL (ref 7–24)
CHLORIDE SERPL-SCNC: 104 MMOL/L (ref 98–107)
CREAT SERPL-MCNC: 0.6 MG/DL (ref 0.55–1.02)
EOSINOPHIL # BLD AUTO: 0 10*3/UL (ref 0–0.4)
EOSINOPHIL # BLD AUTO: 0.1 % (ref 1–4)
ERYTHROCYTE [DISTWIDTH] IN BLOOD BY AUTOMATED COUNT: 12.7 % (ref 0–14.5)
HCT VFR BLD AUTO: 42.6 % (ref 37–47)
HGB BLD-MCNC: 13.8 G/DL (ref 12–16)
LYMPHOCYTES # BLD AUTO: 1.6 10*3/UL (ref 1.3–4.4)
LYMPHOCYTES NFR BLD AUTO: 12.6 % (ref 27–41)
MCH RBC QN AUTO: 30.9 PG (ref 27–31)
MCHC RBC AUTO-ENTMCNC: 32.4 G/DL (ref 33–37)
MCV RBC AUTO: 95.5 FL (ref 81–99)
MONOCYTES # BLD AUTO: 0.8 10*3/UL (ref 0.1–1)
MONOCYTES NFR BLD MANUAL: 6.4 % (ref 3–9)
NEUT #: 10.4 10*3/UL (ref 2.3–7.9)
NEUT %: 80.5 % (ref 47–73)
NRBC BLD QL AUTO: 0 % (ref 0–0)
PLATELET # BLD AUTO: 261 10*3/UL (ref 130–400)
PMV BLD AUTO: 9.1 FL (ref 9.6–12.3)
POTASSIUM SERPL-SCNC: 4.4 MMOL/L (ref 3.5–5.1)
PROT SERPL-MCNC: 7.3 GM/DL (ref 6.4–8.2)
RBC # BLD AUTO: 4.46 10*6/UL (ref 4.1–5.1)
SODIUM SERPL-SCNC: 136 MMOL/L (ref 136–145)
WBC NRBC COR # BLD AUTO: 13 10*3/UL (ref 4.8–10.8)

## 2019-08-28 NOTE — NUR
SPOKE WITH DR. SERRANO AT [phone], RE: PT LABS PER DR SERRANO HE WILL TAKE A LOOK
AT THEM. NO FURTHER ORDERS AT THIST ANILA.

## 2019-08-28 NOTE — NUR
OT NOTE
Pt was seen this P.M. 1:1 for 15 minute OT session with nursing staff present
for observation only. Upon arrival pt was sitting upright in the dining room
in her williams chair. Pt identified by name and  and had reports of "I was
sick all night, but I am feeling better today. I just need to take it easy and
not eat much." Pt's lunch tray arrived which she required Linda for set up for
removing lids from items. Self feeding completed with supervision while
managing spoon and cups. Pt was left sitting upright in the dining room under
[facility] staff supervision. Continue with rec D/C plan to return to LTC.
 
BINDU Santamaria/GEORGE

## 2019-08-28 NOTE — NUR
Called and updated Dr. Galeas regarding patient's condition. Patient no
longer having vomiting and diarrhea at this time. Dr. Galeas said he would
let daylight know so they can order studies to be done.

## 2019-08-28 NOTE — NUR
SPEECH PATHOLOGY
Patient was seen for treatment this pm during lunchtime meal. Patient had been
ill yesterday night and slept through this morning's breakfast.  She was awake
and alert for lunch and stated that she was feeling better but wanted to eat
light. She had broth, jello and a drink. She was able to feed herself without
difficulty and implemented safety strategies such as slow consumption, small
bites and sips and alternating food and liquid. She displayed no oral or
pharyngeal difficulty. She swallowed in a timely manner without pocketing,
holding or spitting out food. Continue therapy plan to ensure safe tolerance
of diet to reduce aspiration risk.
 
REKHA MARC MSCCC-SLP

## 2019-08-28 NOTE — NUR
Patient vomiting clear mucous. Skin feels clammy. Took bedside blood sugar and
it was 171. Took vital signs which were 97.1temp.-76 pulse-16 resp.-149/80 bp
and pulse ox was bouncing between 96-97% on room air. No signs of any other
symptoms. Awaiting for Dr. Galeas to come.

## 2019-08-28 NOTE — NUR
PHYSICAL THERAPY
 
Patient was in bed sound asleep this am when approached for therapy visit. [facility]
staff informed therapist patient had multiple bouts of Emesis early this and
only slept < 4 hours. Staff request patient to be left alone to rest and will
continue per POC as able.    Shelton Maldonado, PTA

## 2019-08-28 NOTE — NUR
Patient slept approx. 4 hours throughout shift prior to vomiting. Q 15 minute
safety checks continued and maintained.

## 2019-08-28 NOTE — NUR
P--CONFUSION
I--EXPLAINED ALL MEDICATIONS BEFORE GIVING THEM. REORIENTED TO PLACE AND TIME.
SPEECH SLOW AND SOFT. LET HER TALK BUT UNABLE TO HOLD CONVERSATION
R--OK I WILL TAKE THEM. TOOK MEDS CRUSHED IN APPLESAUCE. FEEDING SELF SHARI
CRACKER AND TAKING IN FLUIDS
P--CONTINUE TO PROVIDE EMOTIONAL SUPPORT,, REORIENTATION TO TIME AND PLACE.
MONITOR FOR ANY CHANGE IN MOOD AND OR BEHAVIOR

## 2019-08-28 NOTE — NUR
Treatment Plan meeting with Dr. Bean, RN, AT, SW and Discharge Planner. Plan
for discharge next week. Pt. will return to [place].

## 2019-08-28 NOTE — NUR
SPOKE WITH DR. SERRANO AT [phone] RE: PT VOMITED AND HAS 2 EPISODES OF
DIARRHEA OVERNIGHT, PT NO LONGER VOMITING, SKIN IS PALE IN COLOR. PT DROWSY,
RESPONDS TO TACTILE AND VERBAL STIMULI. PER DR. SERRANO HE WILL ORDER LABS AND
COME TAKE A LOOK AT HER.

## 2019-08-28 NOTE — NUR
PM GROUP/ART AND MUSIC
PT ATTENDED AFTERNOON GROUP THERAPY BUT CHOSE NOT TO PARTICIPATE. PT SAT AND
OBSERVED PEERS FOR MOST OF THE GROUP BUT BEGAN SUNDOWNING AND STATING, "I HAVE
TO GO NOW". PT ATTEMPTED TO GET UP FROM THE FRANK CHAIR FOR 15 MINUTES OR MORE
BUT COULD NOT. PT WAS NOT REDIRECTABLE AND FINALLY WORE HERSELF OUT STATING,
"WILL YOU TELL DON WHEN HE GETS HERE TO WAKE ME UP?" PT EXHIBITED NO AGITATION
OR AGGRESSION DURING GROUP

## 2019-08-28 NOTE — NUR
ENRIQUEDEYVI SMITH TANISHA [id number]
 
Please refer to the physician's history and physical for past medical history,
comorbid conditions, and allergies.
   Diagnosis: INTERMITTENT EXPLOSIVE DISORDER
 
   Xavi Score: 15,AT RISK
 
WOUND DESCRIPTIONS:
Wound Number: 1
Location of the wound: right posterior calf
Thickness: Full
Size: 1.1cm x 1.8cm x 0.1cm
Tunneling: none
Undermining: none
Sinus Tract: none
Presence of Exudate: none
Amount: none
Color: Brown, red
Odor: None
Periwound Skin Appearance: Erythema
Wound edges: approximated
Pain (associated with wound): none at time of assessment
How does patient state this happened? pt unable to state how this happened
   Surface the patient is resting on: Proform
 
SKIN PREVENTION RECOMMENDATION:
   1.  Pressure redistribution support surface as appropriate
   2.  Elevate heels
   3.  Remove boots/TEDS every shift and reapply
   4.  Head of bed 30 degrees as tolerated
   5.  Assess nutrition and hydration
   6.  Manage moisture
   7.  Avoid the use of containment devices while in bed
   8.  Use absorptive products on surfaces limit layers of linens on bed
   9.  Turn and reposition every 1-2 hours in bed and every 1 hour in chair as
       tolerated
   10. Weight shifts every 15 minutes while up in chair
   11. Offloading with pillows or device to keep heels elevated off bed
   12. Monitor skin at least every shift
   13. Inspect under medical devices twice a day
 
WOUND TREATMENT RECOMMENDATIONS:
Continue full thickness guidelines to right posterior calf.
Continue heel raiser pro boots while in bed to bilateral feet.

## 2019-08-28 NOTE — NUR
OT NOTE
Attempted to see pt this A.M. for OT session. Upon arrival [facility] staff reported
that pt had been up sick all night and is now resting. Requesting to let her
sleep. Will check back at a later time/date and continue with POC as able.
 
BINDU Santamaria/GEORGE

## 2019-08-28 NOTE — NUR
P: PT DELUSIONAL THINKING SHE IS PREGNANT WITH A FEMALE PEERS BABY. PT
OBSEVRED TO BE SPEAKING TO UNSEEN OTHERS AT TIMES.
 
I: PROVIDED EMOTIONAL SUPPORT AND 1:1 FOR PT TO VOICE FEELINGS, ENCOURAGED MED
COMPLIANCE, PROVIDED RE-ORIENTATION AND PRESENTED REALITY,
 
R: PT CONTINUNES TO BE DELUSIONAL AT TIMES, PLEASANT AND COOPERATIVE WITH
STAFF AND PEERS THIS AFTERNOON. PT ALERT TO PERSON ONLY, CONFUSION AND SHORT
TERM MEMORY DEFICITS NOTED PER PT BASELINE. PT UP TO GERICHAIR REQUIRES 1-2
STAFF ASSIST FOR TRANSFERS AND CARE. PT INCONTINENT OF BOWEL AND BLADDER, CARE
PROVIDED AS NEEDED. PT MED COMPLIANT WITH MINIMAL DIFFICUTLY.
 
P: CONTINUE TO PROVIDE EMOTIONAL SUPPORT AND 1:1 FOR PT TO VOICE FEELINGS,
CONTINUE TO RE-ORIENT AND PRESENT REALITY, ENCOURAGE MED COMPLIANCE

## 2019-08-28 NOTE — NUR
Called and notified Dr. Galeas regarding patient vomiting and having
diarrhea. Dr. Galeas said he would come to examine patient.

## 2019-08-28 NOTE — NUR
Spoke with Sadie Castro at [facility]. Advised of plans to discharge next
week. Clinical Updates faxed to Facility.

## 2019-08-29 VITALS — SYSTOLIC BLOOD PRESSURE: 150 MMHG | DIASTOLIC BLOOD PRESSURE: 81 MMHG

## 2019-08-29 VITALS — SYSTOLIC BLOOD PRESSURE: 148 MMHG | DIASTOLIC BLOOD PRESSURE: 88 MMHG

## 2019-08-29 LAB
APPEARANCE UR: (no result)
BILIRUB UR QL STRIP: NEGATIVE
COLOR UR: YELLOW
EPI CELLS #/AREA URNS HPF: (no result) /[HPF]
GLUCOSE UR QL: (no result)
HGB UR QL STRIP: NEGATIVE
KETONES UR QL STRIP: NEGATIVE
LEUKOCYTE ESTERASE UR QL STRIP: NEGATIVE
NITRITE UR QL STRIP: NEGATIVE
PH UR STRIP: 6 [PH] (ref 5–9)
SP GR UR: 1.02 (ref 1–1.03)
UROBILINOGEN UR STRIP-MCNC: 0.2 E.U./DL (ref 0.2–1)

## 2019-08-29 NOTE — NUR
SPITTING OUT CLEAR THICK PHLEGM. NOSE RUNNING CLEAR. TOOK MEDICATIONS IN
APPLESAUCE THEN WANTED TO GO TO BED. REFUSED 1:1. CHANGED AND PLACED IN BED IN
POSITION OF COMFORT. WILL CONTINUE TO MONITOR SPUTUM.

## 2019-08-29 NOTE — NUR
SPEECH PATHOLOGY
Patient was asleep in williams chair this morning and did not wake up for
breakfast. Staff reported that she was vomiting in the night. Will attempt
treatment during lunchtime meal.
 
REKHA MARC MSCCC-SLP

## 2019-08-29 NOTE — NUR
PHYSICAL THERAPY
 
Patient was semi reclined in activity room [facility] this am when approached
for therapy. Patient did not respond to tactile / verbal stimuli and was not
appropriate for treatment at this time. Will continue per POC as able.
Shelton Maldonado, PTA

## 2019-08-29 NOTE — NUR
PT REFUSED AM MEDICATIONS AND BREAKFAST. NO ADVERSE MOODS OR BEHAVIORS NOTED.
WILL CONTINUE TO MONITOR BEHAVIORS WITH Q15 MINUTE SAFETY CHECKS. URINE
COLLECTED AND SENT TO LAB. WILL UPDATE DR ON LAB RESULTS ONCE AVAILABLE.
INCONTINENCE CARE PROVIDED WHEN NEEDED.

## 2019-08-29 NOTE — NUR
DR GILMAN AND DR SERRANO UPDATED ON ERYTHROMYCIN NOT BEING AVAILABLE FROM
PHARMACY. PHARMACY STATED THEY WERE UNPACKING IT.

## 2019-08-29 NOTE — NUR
OT NOTE
Attempted to see pt this A.M. for OT session and upon arrival pt was sitting
semi reclined in the williams chair in the dining room asleep. [facility] staff reported
that pt had been up sick throughout the night and requesting to let her sleep
at this time. Will check back at a later time/date and continue with POC as
able.
 
BINDU Santamaria/GEORGE

## 2019-08-29 NOTE — NUR
PM GROUP
PT WAS PRESENT FOR AFTERNOON GROUP THERAPY BUT IS UNABLE TO PARTICIPATE DUE TO
COGNITIVE IMPAIRMENT. PT SLEPT RECLINED IN A FRANK CHAIR

## 2019-08-29 NOTE — NUR
DURING HOC MILIEU NOTED A SKIN TEAR TO PATIENTS LEFT LOWER LEG MEASURING 2.5CM
X 1CM X 0.1CM. HOC COMPLETED AND THIS NURSE COMPLETED WOUND CARE. AREA
CLEANSED WITH NSS AND A DRESSING APPLIED. PT PLACED IN FRONT OF NURSES
STATION. PT WAS NOTED TO PLACING LEG DANGLING OVER THE LEFT SIDE OF THE CHAIR.
PT'S LEG REPOSITIONED MULTIPLE TIMES. DR SERRANO UPDATED, DIRECTOR AND SUPERVISOR
UPDATED. VERGE INCIDENT COMPLETED.

## 2019-08-29 NOTE — NUR
Treatment Plan meeting with Dr. Bean, RN, AT, SW and Discharge Planner. Plan
for discharge next week. Pt. will return to [place].

## 2019-08-29 NOTE — NUR
SPEECH PATHOLOGY
Patient was seen for treatment this pm during lunchtime meal. Patient was
upright in chair in activity room with peers. Patient fed herself and ate a
variety of food and liquids. Toward end of meal when patient was consuming
pudding, she was noted to be holding the material in her mouth at times. This
was felt to be due to cognitive status. Patient was easily distracted during
the meal and often times would become distracted by something else in the
room, and not swallow. When she was cued to swallow, she was able to. Patient
was educated on importance of trying to swallow right away to prevent choking.
She nodded in agreement but due to cognition, it is unknown if she fully
comprended information presented. Staff was alerted and recommended to monitor
patient and provide cues as needed. Patient may also benefit from a seating
arrangement where it is less distracting. Continue therapy plan to ensure
safety with oral intake.
 
REKHA [facility]-SLP

## 2019-08-30 VITALS — DIASTOLIC BLOOD PRESSURE: 81 MMHG

## 2019-08-30 VITALS — DIASTOLIC BLOOD PRESSURE: 85 MMHG

## 2019-08-30 NOTE — NUR
Patient alert and oriented to self only with confusion noted. ST/LT memory
deficits noted. Patient compliant with medications and much encouragement.
Assessed patient's mouth afterward to check for pocketing any medication and
mouth was entirely clear. Provided emotional support as much as patient would
allow. Plan to continue to encourage medication compliance and continue to
provide emotional support. Redirect/reorient when appropriate/needed.  Q 15
minute safety checks continued and maintained.  See [facility] flowsheet for further
documentation.

## 2019-08-30 NOTE — NUR
Treatment Plan meeting was held this a.m. with Dr. Bean, RN, AT,  and
Discharge Planner. Plan for discharge Tuesday. Spoke with Sadie Castro at
[place] and notified of discharge Plans. Clinical Updates faxed to facility.

## 2019-08-30 NOTE — NUR
DEYVI LEMA [id number]
 
Please refer to the physician's history and physical for past medical history,
comorbid conditions, and allergies.
   Diagnosis: INTERMITTENT EXPLOSIVE DISORDER
 
   Xavi Score: 15,AT RISK
 
WOUND DESCRIPTIONS:
Wound Number: 2
Location of the wound: left lower leg
Type of wound: skin tear
Thickness: Partial
Size: 2.9cm x 1.8cm x 0.1cm
Tunneling: none
Undermining: none
Sinus Tract: none
Presence of Exudate: Serous sanguineous
Amount: Light
Color: Red
Odor: None
Periwound Skin Appearance: Normal
Wound edges: approximated
Pain (associated with wound): denied at time of assessment
How does patient state this happened? patient unsure how this happened.
If wound is on legs/feet or hands, capillary refill time, pulses, color temp,
sensation: cap refill < 3 seconds.
   Surface the patient is resting on: Proform
 
SKIN PREVENTION RECOMMENDATION:
   1.  Pressure redistribution support surface as appropriate
   2.  Elevate heels
   3.  Remove boots/TEDS every shift and reapply
   4.  Head of bed 30 degrees as tolerated
   5.  Assess nutrition and hydration
   6.  Manage moisture
   7.  Avoid the use of containment devices while in bed
   8.  Use absorptive products on surfaces limit layers of linens on bed
   9.  Turn and reposition every 1-2 hours in bed and every 1 hour in chair as
       tolerated
   10. Weight shifts every 15 minutes while up in chair
   11. Offloading with pillows or device to keep heels elevated off bed
   12. Monitor skin at least every shift
   13. Inspect under medical devices twice a day
 
 
WOUND TREATMENT RECOMMENDATIONS:
Continue current orders.

## 2019-08-30 NOTE — NUR
Met with pt who was sleeping and pt's . Discussed pt's current status
and tentative discharge date. Answered all questions that pt's  had.

## 2019-08-30 NOTE — NUR
Nutritional Support Services Note: Pt is receiving an 1800cal diabetic diet
with Glucerna OS tid with meals. Wounds noted. No other nutrition intervention
noted at this time. Staff to encourage intake and assist as needed. Amber Saenz Rdn Ld

## 2019-08-30 NOTE — NUR
PHYSICAL THERAPY
PT SITTING IN GINNY CHAIR IN ACTIVITY ROOM UPON ARRIVAL. PT IDENTIFIED BY NAME
AND . PT PRESENT WITH BODY ALARM ON. GUERO PRESENT FOR OBSERVATION. PT TAKEN
TO HALLWAY WHERE PT PERFORMED STS FROM GINNY CHAIR TO FWW WITH Yuri X2 WITH PT
HAVING RETRO STANDS ONCE TO STANDING. PT THEN REQUIRED VC'S TO WALKING 30FT X1
WITH Yuri X1 WITH VC'S FOR BIGGER STEPS AND TO LOOK UP WHERE PT WAS WALKING
AND FOLLOWED BY GINNY CHAIR. PT REQUIRED A SHORT SEATED BREAK. PT Yuri X 1 FOR
STS TO GINNY CHAIR. PT STS FROM GINNY CHAIR WITH Yuri X2 WITH VC'S TO STAND UP
STRAIGHT AND VC'S FOR SAFETY WITH STS.  PT GAIT TRAINED 10FTX 1 WITH Yuri X1
WITH VC'S FOR SAFETY WITH PROPER USED OF WALKER TO BATHROOM WITH GUERO TOOK OVER
. PT SEEN 1:1 FOR 18 MINS. MILENA LEE PTA

## 2019-08-30 NOTE — NUR
Nursing screen received and patient is an active patient on OT caseload. Thank
you.
Geraldine Echeverria Otr/l

## 2019-08-30 NOTE — NUR
PHYSICAL THERAPY
 
Nursing screen received. Patient currently on PT caseload. Thank you. Samantha Ballard,PT,DPT.

## 2019-08-30 NOTE — NUR
OT NOTE
Pt was seen this A.M. 1:1 for 15 minute OT session with PTA and nursing staff
present for observation only. Upon arrival pt was sitting semi reclined in the
williams chair in the dining room. Pt identified by name and  and had no
complaints at this time. Pt was taken to her bedroom where she completed sit
to stand from chair level with Linda X 2 and use of w/w for UE support. Upon
inital rise pt presented with retrograde posture that required modA to correct
and mod verbal prompts for proper hand placement on the walker. Functional
mobility completed into the bathroom with Linda and use of w/w with Linda for
walker navigation due to running walker into bed and wall. Pt then stood sink
side while washing her hands with modA due to poor command follow and poor
sequencing. While standing at sink pt had complaint that her stomach was upset
and began to spit in the sink. Pt was sat in williams chair with maxA due to poor
command follow, provided with a basin, and sat upright in the dining room with
body alarm on for safety, and under [facility] staff supervision. Continue with rec
D/C plan to return to LTC.
 
BINDU Santamaria/GEORGE

## 2019-08-30 NOTE — NUR
P: MEDICATION NON COMPLIANCE, LETTING MEDICINE RUN OUT OF MOUTH AND VOICING
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
I: ONE ON ONE, REDIRECTION AND IMPORTANCE OF TAKING MEDICATION.
R: REFUSED MORNING MEDICINE AND TOOK AFTERNOON MEDICINE. PATIENT IS ALERT TO
SELF WITH CONFUSION; ABLE TO VOICE NEEDS. LONG/SHORT TERM MEMORY DEFICITS.
MOOD IS LESS DEPRESSED WITH FLAT AFFECT. RESPONDING TO INTERNAL STIMULI,
VOICED DELUSIONAL THOUGTHS; NO VOICED STATEMENTS OF HI/SI OR PAIN. INTERACTIVE
WITH STAFF. PATIENT IS RESTING QUIETLY WTIH EYES CLOSED.  1-2 PERSON ASSIST
WITH ACTIVITIES OF DAILY LIVING, INCONTINENT OF BOWEL AND BLADDER. SET UP FOR
MEALS, INTAKES ARE IMPROVING. Q 15 MINUTE SAFETY CHECKS MAINTAINED.
 
P: MONITOR FOR MEDICATION COMPLIANCE, ANY HALLUCINATIONS OR DELUSIONS; PROVIDE
ONE ON ONE AND REDIRECTION.

## 2019-08-30 NOTE — NUR
SPEECH THERAPY
 
Patient seen for follow-up swallowing treatment at morning meal. Upon
clinicani arriaval, patient was eating breakfast at table with a basin with
vomit beside her at her feet. Patient's aide reported that she threw up her
medication earlier. She additionally reported patient has not demonstrated any
difficulty with eating, however she has difficulty swallowing her
medication/pills. Patient eating variety of food items at breakfast including
pancakes and scrambled eggs. She additionally was taking sips of milk
throughout meal. Patient observed to hold milk carton up to her mouth, but was
not actively taking a drink. Patietn given a straw to assist in liquid
consumption, as she was then independently able to obtain milk from the
carton. On one trial, patient did not independently elicit a swallow and was
observed holding the milk in her mouth. When asked if she swallowed the milk,
she shook her head no, and then was cued to swallow with immediate response.
Education provided discussing importance of immedate rather than delayed
swallow. She demonstrated no difficulty with solid food items, and no overt
s/s of pentration or aspiration with thin liquids. She independently took sips
of liquid throughout the meal and effectively cleared her oral cavity. Patient
consumed her food at a slow rate, however ate the majority of her tray while
this clinicain was present.
 
Patient tolerating current diet, with no overt s/s of penetration or
aspiration observed. Patient requires monitoring during meals, as she
occassionally pockets liquids prior to eliciting a swallow. Patient's aide
reported that she always has supervision at meal time. Additionally
recommended seating arrangements to assist in reducing distractions of visual
stimuli around her. Patient to continue with regular diet and thin liquids,
with monitoring to occur during meals. Discussed plan of care with patient's
nurse who verbalized understanding.
 
Patient to be discharged due to functional oropharyngeal swallow mechanism and
safety and tolerance of recommended diet. Speech Therapy available for
re-consultation of change in swallow function occurs.
 
Marj Ramos MA CCC-SLP

## 2019-08-31 VITALS — DIASTOLIC BLOOD PRESSURE: 84 MMHG | SYSTOLIC BLOOD PRESSURE: 130 MMHG

## 2019-08-31 VITALS — DIASTOLIC BLOOD PRESSURE: 85 MMHG

## 2019-08-31 NOTE — NUR
AM GROUP/EXERCISE/BINGO!
PT ATTENDED GROUP BUT SLEEPING IN RECLINER. PT BEGAN COUGHING UP FOAMY SALVIA.
THIS STAFF YELLED FOR NURSES HELP WHILE SITTING PT UP IN RECLINER. NURSES
CAME AND ASSISTED PT THEN REMOVED FROM DAYROOM TO NOT RETURN. PT WILL CONTINUE
TO BE ENCOURAGED TO ATTEND AN DPARTICIPATE IN GROUP TO BEST OF PT ABILITY.

## 2019-08-31 NOTE — NUR
Patient stripping off clothes,trying to get out of bed. Placed patient in
Gerichair without tray in front of nurses' desk. Patient continuously yelling
help and disturbing other patients. Unable to redirect. All
non-pharmacological interventions unsuccessful. Medicated with Vistaril IM prn
for agitation/anxiety. Will continue to monitor behaviors.

## 2019-08-31 NOTE — NUR
PATIENT IS ALERT AND ORIENTED TO PERSON WITH CONFUSION. LONG/SHORT TERM MEMORY
DEFICITS. MOOD IS STABLE. NO RESPONSE TO INTERNAL STIMULI OBSERVED. NO VOICED
STATEMENT OF HI/SI OR PAIN. MEDICATION COMPLAINT WITH MORNING; HELD AFTERNOON
MEDICINE DUE TO DROWSINESS. Q 15 MINUTE SAFETY CHECKS MAINTAINED. 1-2 PERSON
ASSIST WITH ACTIVITIES OF DAILY LIVING, INCONTINENT OF BOWEL AND BLADDER. SET
UP FOR MEALS. PATIENT ABLE TO FEEL SELF. GOOD INTAKES THIS MORNING, SLEPT AT
LUNCH TIME. CONTINUE TO MONITOR FOR MEDICATION COMPLIANCE, AGGRESSION AND
DELSIONS. PROVIDE ONE ON ONE AND REDIRECTION AS NEEDED.

## 2019-08-31 NOTE — NUR
PM GROUP/ART/FOOTBALL
PT ATTENDED ENTIRE GROUP BUT RESTING IN RECLINER ENTIRE TIME. PT WILL CONTINUE
TO ATTEND GROUP AND BE ENCOURAGED TO PARTICIPATE TO BEST OF ABILITY.

## 2019-08-31 NOTE — NUR
NO ADVERSE MOODS OR BEHAVIORS NOTED THIS SHIFT. ALERT AND ORIENTED TO PERSON
ONLY. PATIENT PLEASANT AND INTERACTIVE WITH PEERS/STAFF. DENIES
HALLUCINATIONS, SI/HI, OR PAIN. NO S/S OF INTERACTING WITH INTERNAL STIMULI.
NO DELUSIONAL THOUGHT PROCESS NOTED. NO S/S OF DISTRESS, RESPS EVEN AND
UNLABORED ON ROOM AIR. REFUSED HS SNACK, PT STATED THAT SHE WAS NOT FEELING
HUNGRY. MEDICATION COMPLIANT WITH EDUCATION PROVIDED ON EACH. TWO ASSIST WITH
TRANSFERING AND CARE DUE TO UNSTEADY GAIT. INCONTINENT OF URINE, ASSISTANCE
PROVIDED FREQUENTLY. FALLING STAR PROGRAM IN PLACE. SEIZURE PRECAUTIONS IN
PLACE DUE TO HISTORY. Q15 MINUTE CHECKS MAINTAINED FOR SAFETY.

## 2019-08-31 NOTE — NUR
PATIENT WAKE UP COUGHING, THICK, TENACIOUS CLEAR PHLEGM. VITALS: 97.6, 83, 16,
96% RA /65. LUNGS CLEAR. DR. STEPHENS NOITIFIED.

## 2019-09-01 VITALS — DIASTOLIC BLOOD PRESSURE: 72 MMHG | SYSTOLIC BLOOD PRESSURE: 122 MMHG

## 2019-09-01 VITALS — DIASTOLIC BLOOD PRESSURE: 63 MMHG

## 2019-09-01 NOTE — NUR
PATIENT MONITORED ON Q15 MINUTE SAFETY CHECKS THROUGHOUT THE NIGHT. PATIENT
NOTED TO HAVE SLEPT APPROXIMATELY 6 HOURS WITH A BRIEF AWAKENING DUE TO BEING
INCONTINENT OF BM. PATIENT ASSISTED IN GETTING CLEANED UP AND PATIENT WENT
BACK TO SLEEP.

## 2019-09-01 NOTE — NUR
AM GROUP/BIRDHOUSES/MUSIC
PT ATTENDED BUT SLEPT ENTIRE TIME. PT WILL CONTINUE TO ATTEND AND BE
ENCOURAGED TO PARTICIPATE TO BEST OF ABILITY IN FUTURE GROUP SESSIONS.

## 2019-09-01 NOTE — NUR
MOVED OUT OF DININGROOM. YELLING AND DISRUPTING UNIT. ATTEMPTED TO PUNCH NURSE
IN FACE WHILE TRYING TO CLEAN HER UP

## 2019-09-01 NOTE — NUR
NO ADVERSE MOODS OR BEHAVIORS NOTED. MEDICATION COMPLIANT. SPUTUM CULTURE SENT
TO LAB AT 1600. SEE [facility] FLOWSHEET FOR SPECIFIC MONITORING. Q15 MINUTE SAFETY
CHECKS MAINTAINED.

## 2019-09-02 VITALS — SYSTOLIC BLOOD PRESSURE: 134 MMHG | DIASTOLIC BLOOD PRESSURE: 63 MMHG

## 2019-09-02 VITALS — DIASTOLIC BLOOD PRESSURE: 74 MMHG

## 2019-09-02 NOTE — NUR
PM GROUP/MUSIC/ART
PT ATTENDED GROUP BUT RESTED IN RECLINER ENTIRE TIME. PT WILL CONTINUE TO
ATTEND GROUP AND BE ENCOURAGED TO PARTICIPATE TO BEST OF PT ABILITY.

## 2019-09-02 NOTE — NUR
PHYSICAL THERAPY
Patient presented to therapy in sitting in williams-chair with chair alarm
attached and LEs elevated. Patient is in activity room. Patient has no
complaints. Patient was wheeled out in to driscoll way in front of nurses station
to begin treatment. Patient gives informed consent for treatment. AMANDA CHAVEZ IS
PRESENT AS WITNESS TO THE PATIENT'S TREATMENT ON U. Patient performed sit to
stand transfer from williams-chair with MAX A X 1. Patient required maximum verbal
cues for pushing off chair with hands and proper hand placement on the Wh
Walker. Patient ambulated 15' x 1 with Wh Walker and CGA X 1 to MIN A X 1 with
maximum verbal and tactile cues for upright posture, wider STEPHANIE, pushing down
on walker with hands, and increasing step-length. Patient is very lethargic.
RN requested patient be brought back to activity room for breakfast. Patient
was left in williams-chair with chair alarm tested and attached to patient and LEs
elevated. Patient was left in activity room with U staff and NURSES present
and other patients present. Patient treatment was witnessed by AMANDA CHAVEZ.
Patient was 1:1 with this PTA for 15 minutes total.
                                            AZIZA HEADLEY PTA

## 2019-09-02 NOTE — NUR
PATIENT IS ALERT TO SELF WITH CONFUSION; LONG/SHORT MEMORY DEFICITS.  MOOD IS
STABLE. NO HALLUCINATION, DELUSIONS, VOICED HI/SI OR PAIN. NO RESPONSE TO
INTERNAL STIMULI.  MEDICATION COMPAINT. Q 15 MINUTE SAFETY CHECKS MAINTAINED.
UP IN CHAIR RESTING QUIETLY IN DINING ROOM. 2 PERSON ASSIST WITH ACTIVITIES OF
DAILY LIVING, INCONTINENT OF BOWEL AND BLADDER, SET UP FOR MEALS, 1 PERSON
ASSIST WITH MEALS WITH ENCOURAGEMENT OF FLUIDS. ATTENDED GROUP SESSIONS, DID
NOT PARTICIPATE. WITHDRAWN. CONTINUE TO MONITOR FOR AGGRESSION, MEDICATION
COMPLIANCE, HALLUCINATIONS AND DELUSIONS. PROVIDE ONE ON ONE AND REDIRECTION
AS NEEDED.

## 2019-09-02 NOTE — NUR
PLEASENT AND ALERT TO SELF ONLY ATE SNACK AND WAS MEDICATION COMPLIANT
TONIGHT. UNABLE TO HOLD 1:1 DUE TO COGNITION. SHE HAD NO OUTBURST THIS EVENING
AND APPEARED RELAXED.  MOVES SELF AROUND IN CHAIR WITH EASE. GOOD PO INTAKE
FOR HER.

## 2019-09-02 NOTE — NUR
AM GROUP
PT WAS PRESENT FOR MORNING GROUP THERAPY BUT DID NOT PARTICIPATE. PT WAS
RECLINED IN A FRANK CHAIR SLEEPING.

## 2019-09-02 NOTE — NUR
EVENING/RIDDLES/GAMES
PT IN ATTENDANCE OBSERVING PEERS AND LISTENING. PT DID NOT EXPRESS ANY ANXIETY
AT THIS TIME AND WILL CONTINUE TO ATTEND AND BE ENCOURAGED TO PARTICPATE TO
BEST OF PT ABILITY.

## 2019-09-03 VITALS — DIASTOLIC BLOOD PRESSURE: 68 MMHG

## 2019-09-03 VITALS — DIASTOLIC BLOOD PRESSURE: 60 MMHG

## 2019-09-03 LAB
BUN SERPL-MCNC: 19 MG/DL (ref 7–24)
CHLORIDE SERPL-SCNC: 106 MMOL/L (ref 98–107)
CREAT SERPL-MCNC: 0.69 MG/DL (ref 0.55–1.02)
POTASSIUM SERPL-SCNC: 3.8 MMOL/L (ref 3.5–5.1)
SODIUM SERPL-SCNC: 140 MMOL/L (ref 136–145)

## 2019-09-03 NOTE — NUR
Treatment Plan meeting with Dr. Bena, RN, AT, SW and Discharge Planner. Plan
for discharge at the end of the week. PASRR pending for SNF. Pt. will return
to [facility].

## 2019-09-03 NOTE — NUR
PT HAS BEEN QUIET THIS SHIFT, DOES AROUSE TO TACTILE STIMULATION, RISPERDAL
CONSTA ADMINISTERED 12.5 TO RIGHT BUTTOCKS. APPETITE FAIR, NO SI/HI OR
DELUSIONS NOTED. PT RESTING IN BED AT TIMES THROUGHOUT THE MORNING DUE TO
FALLING ASLEEP IN THE CHAIR. MEDICATION COMPLIANT

## 2019-09-03 NOTE — NUR
PHYSICAL THERAPY
 
Patient was sound asleep in activity room Emilee chair upon therapist arrival
and unable to arouse at this time for treatment. Will continue per POC as
able.  Shelton Maldonado, PTA

## 2019-09-03 NOTE — NUR
Spoke with Sadie at [facility]. Notified of plans to discharge at the end of
the week. Pt. will return to [facility]. Clinical Updates faxed to
facility.

## 2019-09-03 NOTE — NUR
PHYSICAL THERAPY DISCHARGE and CO-SIGN
 
 
Patient is currently being treated with skilled PT services. Patient is
making negligible progress toward stated PT goals. Recommend continued
mobility with nursing staff for remainder of stay at [facility]. Discharge from PT at
this time due to lack of progress noted. I approve of the Physical Therapy
notes written above. Thank you.
 
                                ADE CRUZ PT,DPT

## 2019-09-03 NOTE — NUR
Met with pt's  Nick. Pt was also present by was sleeping. Discussed
pt's current status. Reviewed pt's current medications. Discussed pt
discharging soon.

## 2019-09-03 NOTE — NUR
Patient alert and oriented to self only with confusion noted. ST/LT memory
deficits noted. Patient compliant with medications and much encouragement.
Patient swallowed medicine without any problems.  Assessed patient's mouth
afterward to check for pocketing any medication and mouth was entirely clear.
Provided emotional support as much as patient would allow. Plan to continue to
encourage medication compliance and continue to provide emotional support.
Redirect/reorient when appropriate/needed.  Q 15 minute safety checks
continued and maintained.  See [facility] flowsheet for further documentation.

## 2019-09-03 NOTE — NUR
Spoke with Sadie Castro At [facility]. Notified of plans to discharge at
the end of the week. Clinical Updates faxed to Facility.

## 2019-09-03 NOTE — NUR
OT NOTE
Attempted to see pt this A.M. for OT session and upon arrival pt was sitting
reclined in the williams chair in the dining room. Pt was unable to arouse to
verbal or tactile stimuli. Will check back at a later time/date and continue
with POC as able.
 
BINDU Santamaria/GEORGE

## 2019-09-03 NOTE — NUR
PM GROUP/ART AND MUSIC
PT ATTENDED AND PARTICIPATED IN AFTERNOON GROUP THERAPY. PT WAS TALKATIVE AND
DID SOME COLORING. PT WAS MILDLY AND PLEASANTLY CONFUSED. PT EXHIBITED NO
AGITATION OR AGGRESSION WHILE IN GROUP

## 2019-09-03 NOTE — NUR
Patient yelling out and being disruptive to other patients. Patient yelling
"that man is going to rape me and then he is going to torture all these women
here.". There was no man standing where patient was pointing. Attempted to
redirect and reorient but was unsuccessful. All non-pharmacological
interventions unsuccessful. Medicated with Vistaril IM prn for
agitation/anxiety. Will monitor behaviors.

## 2019-09-04 VITALS — SYSTOLIC BLOOD PRESSURE: 140 MMHG | DIASTOLIC BLOOD PRESSURE: 80 MMHG

## 2019-09-04 VITALS — DIASTOLIC BLOOD PRESSURE: 81 MMHG

## 2019-09-04 LAB
BASOPHILS # BLD AUTO: 0 10*3/UL (ref 0–0.1)
BASOPHILS NFR BLD AUTO: 0.1 % (ref 0–1)
EOSINOPHIL # BLD AUTO: 0.2 10*3/UL (ref 0–0.4)
EOSINOPHIL # BLD AUTO: 1.5 % (ref 1–4)
ERYTHROCYTE [DISTWIDTH] IN BLOOD BY AUTOMATED COUNT: 12.9 % (ref 0–14.5)
HCT VFR BLD AUTO: 39.1 % (ref 37–47)
HGB BLD-MCNC: 13 G/DL (ref 12–16)
LYMPHOCYTES # BLD AUTO: 2.3 10*3/UL (ref 1.3–4.4)
LYMPHOCYTES NFR BLD AUTO: 22.8 % (ref 27–41)
MCH RBC QN AUTO: 31.3 PG (ref 27–31)
MCHC RBC AUTO-ENTMCNC: 33.2 G/DL (ref 33–37)
MCV RBC AUTO: 94.2 FL (ref 81–99)
MONOCYTES # BLD AUTO: 0.6 10*3/UL (ref 0.1–1)
MONOCYTES NFR BLD MANUAL: 6.3 % (ref 3–9)
NEUT #: 6.8 10*3/UL (ref 2.3–7.9)
NEUT %: 69 % (ref 47–73)
NRBC BLD QL AUTO: 0 10*3/UL (ref 0–0)
PLATELET # BLD AUTO: 269 10*3/UL (ref 130–400)
PMV BLD AUTO: 9.2 FL (ref 9.6–12.3)
RBC # BLD AUTO: 4.15 10*6/UL (ref 4.1–5.1)
WBC NRBC COR # BLD AUTO: 9.9 10*3/UL (ref 4.8–10.8)

## 2019-09-04 NOTE — NUR
P: COMBATIVE WITH HANDS ON CARE, ASKING NURSE "ARE YOU A MURDERER?", KICKING
AND HITTING AT NURSE. PATIENT REFUSING MEDICATIONS.
I: ONE ON ONE, REDIRECTIONS, ENCOURAGED TO TAKE MEDICATIONS AS PRESCRIBED AND
PROVIDED SPACE. PATIENT YELLING OUT.
R: PROVIDING SPACE EFFECTIVE. PATIENT IS ALERT TO PERSON, AWARE OF BEING IN
THE HOSPITAL WITH CONFUSION.  MOOD IS DEPRESSED, IRRITABLE AND LABILE. DENIES
ANY HALLUCINATIONS, DELUSIONS, HI/SI OR PAIN. PATIENT RESPONDING TO INTERNAL
STIMULI. PARANOID THOUGHTS VOICED. REFUSED MEDICINE X2 ATTEMPTS. PATIENT
CONTINUES TO REFUSED. Q 15 MINUTE SAFETY CHECKS MAINTAINED. 2 PERSON ASSIST
WITH ACTIVITIES OF DAILY LIVING, INCONTINENT OF BOWEL AND BLADDER. SET UP FOR
MEALS. INTAKES VARIES.  PATIENT IN DINING ROOM, RESTING WITH EYES CLOSED IN
FRANK CHAIR. WITHDRAWN. PATIENT IN DINING ROOM FOR ACTIVITIES, DID NOT
PARTICIPATE.
P: CONTINUE TO MONITOR MOOD, VOICED HALLUCINATIONS, DELUSIONS AND AGGRESSION
TOWARDS STAFF. PROVIDE ONE ON ONE, REDIRECTION AND SPACE AS NEEDED.

## 2019-09-04 NOTE — NUR
AM GROUP
PT WAS PRESENT FOR MORNING GROUP THERAPY AND WAS YELLING OUT AND HAVING
PARANOID DELUSIONS. PT STATED, "THEY ARE GOING TO KILL ME HERE"  "THIS
BUILDING IS GOING TO CAVE IN" "YOU ARE HURTING ME", ETC. PT COULD NOT BE
REDIRECTED.

## 2019-09-04 NOTE — NUR
OT NOTE
Attempted to see pt this A.M. for OT session and upon arrival pt was asleep in
the quiet room in her williams chair. Pt was unable to arouse to verbal and
tactile stimuli. Will check back at a later time/date and continue with POC as
able.
 
BINDU Santamaria/GEORGE

## 2019-09-04 NOTE — NUR
ENRIQUEDEYVI SMITH TANISHA [id number]
 
Please refer to the physician's history and physical for past medical history,
comorbid conditions, and allergies.
   Diagnosis: INTERMITTENT EXPLOSIVE DISORDER
 
   Xavi Score: 15,AT RISK
 
WOUND DESCRIPTIONS:
Wound Number: 4 ( new skin impairment )
Location of the wound: left forearm
Type of wound: skin tear
Thickness: Partial
Size: 0.6cm x 1.2cm x 0.1cm
Tunneling: none
Undermining: none
Sinus Tract: none
Presence of Exudate: Serosanguineous
Amount: Light
Color: Red
Odor: None
Periwound Skin Appearance: Normal
Wound edges: approximated
Pain (associated with wound): none at time of assessment
How does patient state this happened? pt state he bumped it this morning
   Surface the patient is resting on: Position Pro
 
SKIN PREVENTION RECOMMENDATION:
   1.  Pressure redistribution support surface as appropriate
   2.  Elevate heels
   3.  Remove boots/TEDS every shift and reapply
   4.  Head of bed 30 degrees as tolerated
   5.  Assess nutrition and hydration
   6.  Manage moisture
   7.  Avoid the use of containment devices while in bed
   8.  Use absorptive products on surfaces limit layers of linens on bed
   9.  Turn and reposition every 1-2 hours in bed and every 1 hour in chair as
       tolerated
   10. Weight shifts every 15 minutes while up in chair
   11. Offloading with pillows or device to keep heels elevated off bed
   12. Monitor skin at least every shift
   13. Inspect under medical devices twice a day
 
WOUND TREATMENT RECOMMENDATIONS:
Continue current skin tear orders.

## 2019-09-04 NOTE — NUR
Treatment Plan meeting with Dr. Bean, RN, AT,  and Discharge Planner. Plan
for discharge Friday. Pt. will return to [place].

## 2019-09-04 NOTE — NUR
PATIENT BOWEL SOUNDS ACTIVE X 4; PATIENT ASSISTED TO BATHROOM FOR TOILETING
NEEDS. PATIENT HAD A SMALL BM. OFFERED PATIENT PRUNE JUICE BUT DECLINED.

## 2019-09-04 NOTE — NUR
DEYVI LEMA [id number]
 
Please refer to the physician's history and physical for past medical history,
comorbid conditions, and allergies.
   Diagnosis: INTERMITTENT EXPLOSIVE DISORDER
 
   Xavi Score: 15,AT RISK
 
WOUND DESCRIPTIONS:
Wound Number: 1
Location of the wound: right posterior calf
Thickness: Full
Size: 1.0cm x 1.2cm x 0.1cm
Tunneling: none
Undermining: none
Sinus Tract: none
Presence of Exudate: none
Amount: none
Color: Brown, red
Odor: None
Periwound Skin Appearance: Erythema
Wound edges: approximated
Pain (associated with wound): none at time of assessment
How does patient state this happened? pt unable to state how this happened
 
Wound Number: 2
Location of the wound: left lower leg
Type of wound: skin tear
Thickness: Partial
Size: 2.4cm x 1.5cm x 0.1cm
Tunneling: none
Undermining: none
Sinus Tract: none
Presence of Exudate: Serosanguineous
Amount: Light
Color: Red
Odor: None
Periwound Skin Appearance: Normal
Wound edges: approximated
Pain (associated with wound): denied at time of assessment
How does patient state this happened? patient unsure how this happened.
If wound is on legs/feet or hands, capillary refill time, pulses, color temp,
sensation: cap refill < 3 seconds.
   Surface the patient is resting on: Proform
 
SKIN PREVENTION RECOMMENDATION:
   1.  Pressure redistribution support surface as appropriate
   2.  Elevate heels
   3.  Remove boots/TEDS every shift and reapply
   4.  Head of bed 30 degrees as tolerated
   5.  Assess nutrition and hydration
   6.  Manage moisture
   7.  Avoid the use of containment devices while in bed
   8.  Use absorptive products on surfaces limit layers of linens on bed
   9.  Turn and reposition every 1-2 hours in bed and every 1 hour in chair as
       tolerated
   10. Weight shifts every 15 minutes while up in chair
   11. Offloading with pillows or device to keep heels elevated off bed
   12. Monitor skin at least every shift
   13. Inspect under medical devices twice a day
 
WOUND TREATMENT RECOMMENDATIONS:
Continue skin tear guidelines per physician order.

## 2019-09-04 NOTE — NUR
PM GROUP
PT WAS PRESENT FOR AFTERNOON GROUP THERAPY SLEEPING RECLINED IN A FRANK CHAIR.
PT DID NOT WAKE DURING GROUP

## 2019-09-04 NOTE — NUR
P-CONFUSION. PATIIENT WITH SHORT TERM AND LONG TERM MEMORY DEFICITS
I-REDIRECTION WITH 1:1 THERAPEUTIC INTERVENTIONS AND PRESENT REALITY. EDUCATE
AND ENCOURAGE MEDICATION COMPLIANCE
R-REDIRECTION AND PRESENT REALITY EFFECTIVE FOR SHORT PERIODS OF TIME
THROUGHOUT SHIFT. DIFFULTY WITH PATIENT TAKING MEDICAITONS AT .
P-CONTINUE TO PRESENT REALITY, CONTINUE TO ENCOURAGE MEDICATION COMPLIANCE,
ENCOURAGE GROUP THERAPY WHILE AWAKE

## 2019-09-04 NOTE — NUR
Occupational Therapy Discharge Summary
Patient was evaluated and POC established 8/19/19. Patient was treated in
occupational therapy 6/11 offered sessions. She was c/o illness 5/11
sessions primarily with stomach upset.  Patient is inconsistant in her
performance but has progressed in feeding, functional transfers and mobility
and w/c posture and positioning.
Levels 8/20/19                     Level 9/4/19
Max A grooming                     MOd A grooming
Supervision Feeding                Supervision feeding
Max A transfers                   Min/Mod A transfers
Gerichair positioning             Tolerates w/c for feeding
Gerichair positioning             Good sitting posture in w/c
Unable to propel w/c              Unable to propel w/c
Recommend Discharge OT at this time d/t max potential toward goals. Patient to
return to [facility] upon d/c.
Geraldine Echeverria OTR/l

## 2019-09-05 VITALS — SYSTOLIC BLOOD PRESSURE: 133 MMHG | DIASTOLIC BLOOD PRESSURE: 60 MMHG

## 2019-09-05 VITALS — DIASTOLIC BLOOD PRESSURE: 63 MMHG

## 2019-09-05 NOTE — NUR
PM GROUP/MOVIE AND CRAFTS
PT WAS PRESENT FOR AFTERNOON GROUP THERAPY RECLINED IN A FRANK CHAIR SLEEPING.
PT DID NOT WAKE DURING GROUP

## 2019-09-05 NOTE — NUR
AM GROUP
PT ATTENDED AND PARTICIPATED IN MORNING GROUP THERAPY UNTIL SHE FELL ASLEEP IN
FRANK CHAIR. PT WAS QUIET AND SEEMED LESS CONFUSED THIS MORNING. PT EXHIBITED
NO AGITATION OR AGGRESSION IN GROUP

## 2019-09-05 NOTE — NUR
Treatment Plan meeting with Dr. Bean, RN, AT,  and Discharge Planner. Plan
for discharge Friday. Pt. will return to [place].

## 2019-09-05 NOTE — NUR
P-CONFUSION. PATIIENT WITH SHORT TERM AND LONG TERM MEMORY DEFICITS. PATIENT
WITH NO SUICIDAL OR HOMICIDAL IDEATIONS.
 
I-REDIRECTION WITH 1:1 THERAPEUTIC INTERVENTIONS AND PRESENT REALITY. EDUCATE
AND ENCOURAGE MEDICATION COMPLIANCE
 
R-REDIRECTION AND PRESENT REALITY EFFECTIVE FOR SHORT PERIODS OF TIME
THROUGHOUT SHIFT. DIFFULTY WITH PATIENT TAKING MEDICAITONS AT .
 
P-CONTINUE TO PRESENT REALITY, CONTINUE TO ENCOURAGE MEDICATION COMPLIANCE,
ENCOURAGE GROUP THERAPY WHILE AWAKE

## 2019-09-06 VITALS — DIASTOLIC BLOOD PRESSURE: 67 MMHG | SYSTOLIC BLOOD PRESSURE: 145 MMHG

## 2019-09-06 NOTE — NUR
Treatment Plan meeting with Dr. Bean, RN, AT,  and Discharge Planner. Plan
for discharge today. Pt. will discharge to [facility]. Call placed to hamida Nick and notified of discharge today. Also
spoke with Sadie Castro at [facility] and notified of discharge.

## 2019-09-06 NOTE — NUR
PT WITH LIFE FLEET OFF UNIT AT THIS TIME. NURSE TO NURSE CALLED TO YASH AT
[place]. ALL BELONGINGS SENT WITH PT.

## 2024-06-16 NOTE — NUR
AM GROUP
PT WAS PRESENT FOR MORNING GROUP THERAPY BUT DID NOT PARTICIPATE. PT WAS
RECLINED IN A FRANK CHAIR SLEEPING. Jeronimo, Mor interpretation of diagnostic studies/consultation with other physicians/consult w/ pt's family directly relating to pts condition/additional history taking/direct patient care (not related to procedure)/documentation